# Patient Record
Sex: MALE | Race: WHITE | NOT HISPANIC OR LATINO | Employment: FULL TIME | URBAN - METROPOLITAN AREA
[De-identification: names, ages, dates, MRNs, and addresses within clinical notes are randomized per-mention and may not be internally consistent; named-entity substitution may affect disease eponyms.]

---

## 2018-07-24 ENCOUNTER — HOSPITAL ENCOUNTER (EMERGENCY)
Facility: HOSPITAL | Age: 28
Discharge: HOME | End: 2018-07-25
Attending: EMERGENCY MEDICINE
Payer: COMMERCIAL

## 2018-07-24 DIAGNOSIS — L02.31 ABSCESS, GLUTEAL, RIGHT: Primary | ICD-10-CM

## 2018-07-24 DIAGNOSIS — L03.317 CELLULITIS, GLUTEAL, RIGHT: ICD-10-CM

## 2018-07-24 PROCEDURE — 25000003 PHARM REV CODE 250: Performed by: EMERGENCY MEDICINE

## 2018-07-24 PROCEDURE — 99283 EMERGENCY DEPT VISIT LOW MDM: CPT | Mod: 25

## 2018-07-24 PROCEDURE — 10060 I&D ABSCESS SIMPLE/SINGLE: CPT

## 2018-07-24 RX ORDER — LIDOCAINE HYDROCHLORIDE AND EPINEPHRINE 10; 10 MG/ML; UG/ML
10 INJECTION, SOLUTION INFILTRATION; PERINEURAL
Status: DISCONTINUED | OUTPATIENT
Start: 2018-07-24 | End: 2018-07-25 | Stop reason: HOSPADM

## 2018-07-24 RX ORDER — HYDROCODONE BITARTRATE AND ACETAMINOPHEN 5; 325 MG/1; MG/1
1 TABLET ORAL
Status: COMPLETED | OUTPATIENT
Start: 2018-07-24 | End: 2018-07-24

## 2018-07-24 RX ADMIN — HYDROCODONE BITARTRATE AND ACETAMINOPHEN 1 TABLET: 5; 325 TABLET ORAL at 11:07

## 2018-07-25 VITALS
RESPIRATION RATE: 39 BRPM | SYSTOLIC BLOOD PRESSURE: 128 MMHG | HEIGHT: 68 IN | WEIGHT: 155 LBS | DIASTOLIC BLOOD PRESSURE: 58 MMHG | OXYGEN SATURATION: 97 % | BODY MASS INDEX: 23.49 KG/M2 | TEMPERATURE: 100 F | HEART RATE: 98 BPM

## 2018-07-25 PROBLEM — L03.317 CELLULITIS, GLUTEAL, RIGHT: Status: ACTIVE | Noted: 2018-07-25

## 2018-07-25 PROBLEM — L02.31 ABSCESS, GLUTEAL, RIGHT: Status: ACTIVE | Noted: 2018-07-25

## 2018-07-25 PROCEDURE — 10060 I&D ABSCESS SIMPLE/SINGLE: CPT

## 2018-07-25 RX ORDER — NAPROXEN 500 MG/1
500 TABLET ORAL 2 TIMES DAILY WITH MEALS
Qty: 20 TABLET | Refills: 0 | Status: SHIPPED | OUTPATIENT
Start: 2018-07-25 | End: 2018-11-27

## 2018-07-25 RX ORDER — SULFAMETHOXAZOLE AND TRIMETHOPRIM 800; 160 MG/1; MG/1
1 TABLET ORAL 2 TIMES DAILY
Qty: 14 TABLET | Refills: 0 | Status: SHIPPED | OUTPATIENT
Start: 2018-07-25 | End: 2018-08-01

## 2018-07-25 NOTE — ED PROVIDER NOTES
SCRIBE #1 NOTE: I, Paola Jaime, am scribing for, and in the presence of, Juan Carlos Cai Jr., MD. I have scribed the entire note.      History      Chief Complaint   Patient presents with    Abscess     Pt c/o abscess to right buttock x 3-4 days, noticed after digging a ditch, started taking an old clindamycin prescription, no improvement to redness and swelling reported.        Review of patient's allergies indicates:   Allergen Reactions    Pcn [penicillins] Anaphylaxis    Amoxicillin Hives        HPI   HPI    7/24/2018, 10:47 PM   History obtained from the patient      History of Present Illness: Margarito Gale is a 27 y.o. male patient who presents to the Emergency Department for an abscess to his R buttock which onset gradually 3-4 days PTA. Symptoms are constant and moderate in severity. No mitigating or exacerbating factors reported. He reports taking an old Clindamycin Rx with no relief. Associated sxs include R buttocks pain, fever (Tmax 101), and surrounding erythema. Patient denies any n/v, falls, trauma, and all other sxs at this time. No further complaints or concerns at this time.       Arrival mode: Personal vehicle    PCP: FREDDIE Butcher       Past Medical History:  Past Medical History:   Diagnosis Date    ADD (attention deficit disorder)     Lumbago        Past Surgical History:  Past Surgical History:   Procedure Laterality Date    TONSILLECTOMY           Family History:  History reviewed. No pertinent family history.    Social History:  Social History     Social History Main Topics    Smoking status: Current Every Day Smoker     Packs/day: 0.50     Types: Cigarettes    Smokeless tobacco: unknown    Alcohol use Yes      Comment: normally drink 3-4 beers a day    Drug use: Yes     Types: Marijuana    Sexual activity: unknown       ROS   Review of Systems   Constitutional: Positive for fever (Tmax 101).   HENT: Negative for sore throat.    Respiratory: Negative for  shortness of breath.    Cardiovascular: Negative for chest pain.   Gastrointestinal: Negative for diarrhea, nausea and vomiting.   Genitourinary: Negative for dysuria.   Musculoskeletal: Positive for myalgias (R buttocks). Negative for back pain.   Skin: Positive for color change (erythema). Negative for rash.        (+) Abscess to R buttocks   Neurological: Negative for weakness and numbness.   Hematological: Does not bruise/bleed easily.   All other systems reviewed and are negative.    Physical Exam      Initial Vitals [07/24/18 2010]   BP Pulse Resp Temp SpO2   130/73 (!) 126 18 97.9 °F (36.6 °C) 98 %      MAP       --          Physical Exam  Nursing Notes and Vital Signs Reviewed.  Constitutional: Patient is in no acute distress. Well-developed and well-nourished.  Head: Atraumatic. Normocephalic.  Eyes: PERRL. EOM intact. Conjunctivae are not pale. No scleral icterus.  ENT: Mucous membranes are moist. Oropharynx is clear and symmetric.    Neck: Supple. Full ROM. No lymphadenopathy.  Cardiovascular: Regular rate. Regular rhythm. No murmurs, rubs, or gallops. Distal pulses are 2+ and symmetric.  Pulmonary/Chest: No respiratory distress. Clear to auscultation bilaterally. No wheezing or rales.  Abdominal: Soft and non-distended.  There is no tenderness.  No rebound, guarding, or rigidity.  Musculoskeletal: Moves all extremities. No obvious deformities.   Skin: Warm and dry. 7x8cm area of fluctuance to R buttocks with central eschar and surrounding erythema and edema. No drainage.  Neurological:  Alert, awake, and appropriate.  Normal speech.  No acute focal neurological deficits are appreciated.  Psychiatric: Normal affect. Good eye contact. Appropriate in content.    ED Course    I & D - Incision and Drainage  Date/Time: 7/25/2018 12:06 AM  Performed by: CHUCKY PEDERSEN JR  Authorized by: CHUCKY PEDERSEN JR   Consent Done: Yes  Consent: Verbal consent obtained.  Risks and benefits: risks, benefits and alternatives  "were discussed  Consent given by: patient  Patient understanding: patient states understanding of the procedure being performed  Patient consent: the patient's understanding of the procedure matches consent given  Patient identity confirmed: , name and verbally with patient  Type: abscess  Location: R buttocks.  Anesthesia: local infiltration    Anesthesia:  Local Anesthetic: lidocaine 1% with epinephrine  Anesthetic total: 11 mL  Patient sedated: no  Scalpel size: 11  Incision type: single straight  Complexity: simple  Drainage: pus  Drainage amount: copious  Wound treatment: incision,  drainage,  expression of material and  wound packed  Packing material:  in gauze  Complications: No  Patient tolerance: Patient tolerated the procedure well with no immediate complications        ED Vital Signs:  Vitals:    18 2240 18 2300 18 0000   BP: 130/73  113/60 (!) 128/58   Pulse: (!) 126 100 104 98   Resp: 18  16 (!) 39   Temp: 97.9 °F (36.6 °C)  99.6 °F (37.6 °C)    TempSrc: Oral  Oral    SpO2: 98%  97% 97%   Weight: 70.3 kg (155 lb)      Height: 5' 8" (1.727 m)                 The Emergency Provider reviewed the vital signs and test results, which are outlined above.    ED Discussion     12:42 AM: Reassessed pt at this time.  Pt states his condition has improved at this time. Pt has a ride at bedside, advised him not to drive home due to the [place]. Discussed with pt all pertinent ED information and results. Discussed pt dx and plan of tx. Gave pt all f/u and return to the ED instructions. All questions and concerns were addressed at this time. Pt expresses understanding of information and instructions, and is comfortable with plan to discharge. Pt is stable for discharge.    I discussed wound care precautions with patient and/or family/caretaker; specifically that all wounds have risk of infection. I discussed with patient need to return for any signs of infection, specifically " redness, increased pain, fever, drainage of pus, or any concern, immediately.    For  CELLULITIS/ABSCESS, I advised patient to: keep area clean and dry; apply antibiotic ointment as prescribed; refrain from squeezing or irritating site; apply moist heat to help with pain and abscess drainage; and to take antibiotics as prescribed. Patient was instructed to follow up with primary care provider, urgent care facility, or the emergency room if symptoms worsen and they develop a fever greater than 102.5 °F, have pain unrelieved by OTC or prescription medications, and excessive swelling. Also instructed patient to follow up with provider in 24-48 hours for wound re-check and possible packing change.      Driving or other activities under influence of medications - Patient and/or family/caretaker was given a prescription for, or instructed to use a medicine that may impair ability to drive, operate machinery, or participate in other potentially dangerous activities.  Patient was instructed not to participate in these activities while under the influence of these medications.    ED Medication(s):  Medications   HYDROcodone-acetaminophen 5-325 mg per tablet 1 tablet (1 tablet Oral Given 7/24/18 2321)       Discharge Medication List as of 7/25/2018 12:45 AM      START taking these medications    Details   naproxen (NAPROSYN) 500 MG tablet Take 1 tablet (500 mg total) by mouth 2 (two) times daily with meals., Starting Wed 7/25/2018, Print      sulfamethoxazole-trimethoprim 800-160mg (BACTRIM DS) 800-160 mg Tab Take 1 tablet by mouth 2 (two) times daily. for 7 days, Starting Wed 7/25/2018, Until Wed 8/1/2018, Print             Follow-up Information     FREDDIE Butcher. Schedule an appointment as soon as possible for a visit in 1 week.    Specialty:  Family Medicine  Contact information:  [address] [facility] [id number] [phone]             [facility] - .     Specialty:  Emergency Medicine  Why:  As needed, If symptoms worsen  Contact information:  [address]  [facility] [address] [phone]                   Medical Decision Making              Scribe Attestation:   Scribe #1: I performed the above scribed service and the documentation accurately describes the services I performed. I attest to the accuracy of the note.    Attending:   Physician Attestation Statement for Scribe #1: I, Juan Carlos Cai Jr., MD, personally performed the services described in this documentation, as scribed by Paola Jaime, in my presence, and it is both accurate and complete.          Clinical Impression       ICD-10-CM ICD-9-CM   1. Abscess, gluteal, right L02.31 682.5   2. Cellulitis, gluteal, right L03.317 682.5       Disposition:   Disposition: Discharged  Condition: Stable         Juan Carlos Cai Jr., MD  08/01/18 2050

## 2018-11-27 ENCOUNTER — HOSPITAL ENCOUNTER (INPATIENT)
Facility: HOSPITAL | Age: 28
LOS: 4 days | Discharge: LEFT BEFORE DISCHARGE | DRG: 854 | End: 2018-12-01
Attending: EMERGENCY MEDICINE | Admitting: INTERNAL MEDICINE
Payer: MEDICAID

## 2018-11-27 ENCOUNTER — ANESTHESIA EVENT (OUTPATIENT)
Dept: SURGERY | Facility: HOSPITAL | Age: 28
DRG: 854 | End: 2018-11-27
Payer: MEDICAID

## 2018-11-27 ENCOUNTER — ANESTHESIA (OUTPATIENT)
Dept: SURGERY | Facility: HOSPITAL | Age: 28
DRG: 854 | End: 2018-11-27
Payer: MEDICAID

## 2018-11-27 DIAGNOSIS — L03.119 CELLULITIS OF HAND: ICD-10-CM

## 2018-11-27 DIAGNOSIS — R00.0 TACHYCARDIA: ICD-10-CM

## 2018-11-27 DIAGNOSIS — L03.119 CELLULITIS OF HAND: Primary | ICD-10-CM

## 2018-11-27 PROBLEM — R73.9 HYPERGLYCEMIA: Status: ACTIVE | Noted: 2018-11-27

## 2018-11-27 PROBLEM — Z72.0 TOBACCO ABUSE: Status: ACTIVE | Noted: 2018-11-27

## 2018-11-27 LAB
ALBUMIN SERPL BCP-MCNC: 4.3 G/DL
ALP SERPL-CCNC: 86 U/L
ALT SERPL W/O P-5'-P-CCNC: 19 U/L
AMPHET+METHAMPHET UR QL: NORMAL
ANION GAP SERPL CALC-SCNC: 12 MMOL/L
AST SERPL-CCNC: 32 U/L
BARBITURATES UR QL SCN>200 NG/ML: NEGATIVE
BASOPHILS # BLD AUTO: 0.02 K/UL
BASOPHILS NFR BLD: 0.1 %
BENZODIAZ UR QL SCN>200 NG/ML: NEGATIVE
BILIRUB SERPL-MCNC: 1.1 MG/DL
BILIRUB UR QL STRIP: NEGATIVE
BUN SERPL-MCNC: 14 MG/DL
BZE UR QL SCN: NEGATIVE
CALCIUM SERPL-MCNC: 9.9 MG/DL
CANNABINOIDS UR QL SCN: NORMAL
CHLORIDE SERPL-SCNC: 101 MMOL/L
CK SERPL-CCNC: 251 U/L
CLARITY UR: CLEAR
CO2 SERPL-SCNC: 21 MMOL/L
COLOR UR: YELLOW
CREAT SERPL-MCNC: 1 MG/DL
CREAT UR-MCNC: 103 MG/DL
DIFFERENTIAL METHOD: ABNORMAL
EOSINOPHIL # BLD AUTO: 0 K/UL
EOSINOPHIL NFR BLD: 0 %
ERYTHROCYTE [DISTWIDTH] IN BLOOD BY AUTOMATED COUNT: 11.4 %
EST. GFR  (AFRICAN AMERICAN): >60 ML/MIN/1.73 M^2
EST. GFR  (NON AFRICAN AMERICAN): >60 ML/MIN/1.73 M^2
ESTIMATED AVG GLUCOSE: 108 MG/DL
GLUCOSE SERPL-MCNC: 151 MG/DL
GLUCOSE UR QL STRIP: NEGATIVE
HBA1C MFR BLD HPLC: 5.4 %
HCT VFR BLD AUTO: 35.8 %
HGB BLD-MCNC: 12.5 G/DL
HGB UR QL STRIP: NEGATIVE
KETONES UR QL STRIP: NEGATIVE
LACTATE SERPL-SCNC: 1.7 MMOL/L
LEUKOCYTE ESTERASE UR QL STRIP: NEGATIVE
LYMPHOCYTES # BLD AUTO: 0.4 K/UL
LYMPHOCYTES NFR BLD: 1.8 %
MCH RBC QN AUTO: 30.9 PG
MCHC RBC AUTO-ENTMCNC: 34.9 G/DL
MCV RBC AUTO: 89 FL
METHADONE UR QL SCN>300 NG/ML: NEGATIVE
MONOCYTES # BLD AUTO: 1.3 K/UL
MONOCYTES NFR BLD: 5 %
NEUTROPHILS # BLD AUTO: 23.4 K/UL
NEUTROPHILS NFR BLD: 93.1 %
NITRITE UR QL STRIP: NEGATIVE
OPIATES UR QL SCN: NORMAL
PCP UR QL SCN>25 NG/ML: NEGATIVE
PH UR STRIP: 6 [PH] (ref 5–8)
PLATELET # BLD AUTO: 252 K/UL
PMV BLD AUTO: 9.2 FL
POTASSIUM SERPL-SCNC: 3.8 MMOL/L
PROCALCITONIN SERPL IA-MCNC: 0.91 NG/ML
PROT SERPL-MCNC: 7.5 G/DL
PROT UR QL STRIP: NEGATIVE
RBC # BLD AUTO: 4.04 M/UL
SODIUM SERPL-SCNC: 134 MMOL/L
SP GR UR STRIP: 1.01 (ref 1–1.03)
TOXICOLOGY INFORMATION: NORMAL
URN SPEC COLLECT METH UR: NORMAL
UROBILINOGEN UR STRIP-ACNC: NEGATIVE EU/DL
WBC # BLD AUTO: 25.09 K/UL

## 2018-11-27 PROCEDURE — 01N50ZZ RELEASE MEDIAN NERVE, OPEN APPROACH: ICD-10-PCS | Performed by: ORTHOPAEDIC SURGERY

## 2018-11-27 PROCEDURE — 87076 CULTURE ANAEROBE IDENT EACH: CPT

## 2018-11-27 PROCEDURE — 25000003 PHARM REV CODE 250: Performed by: NURSE ANESTHETIST, CERTIFIED REGISTERED

## 2018-11-27 PROCEDURE — 87070 CULTURE OTHR SPECIMN AEROBIC: CPT

## 2018-11-27 PROCEDURE — 87147 CULTURE TYPE IMMUNOLOGIC: CPT

## 2018-11-27 PROCEDURE — 99233 SBSQ HOSP IP/OBS HIGH 50: CPT | Mod: 57,,, | Performed by: ORTHOPAEDIC SURGERY

## 2018-11-27 PROCEDURE — 37000008 HC ANESTHESIA 1ST 15 MINUTES: Performed by: ORTHOPAEDIC SURGERY

## 2018-11-27 PROCEDURE — 80053 COMPREHEN METABOLIC PANEL: CPT

## 2018-11-27 PROCEDURE — 99285 EMERGENCY DEPT VISIT HI MDM: CPT | Mod: 25

## 2018-11-27 PROCEDURE — 83036 HEMOGLOBIN GLYCOSYLATED A1C: CPT

## 2018-11-27 PROCEDURE — 96375 TX/PRO/DX INJ NEW DRUG ADDON: CPT | Mod: 59

## 2018-11-27 PROCEDURE — 63600175 PHARM REV CODE 636 W HCPCS: Performed by: EMERGENCY MEDICINE

## 2018-11-27 PROCEDURE — 0KNC0ZZ RELEASE RIGHT HAND MUSCLE, OPEN APPROACH: ICD-10-PCS | Performed by: ORTHOPAEDIC SURGERY

## 2018-11-27 PROCEDURE — 36000706: Performed by: ORTHOPAEDIC SURGERY

## 2018-11-27 PROCEDURE — 84145 PROCALCITONIN (PCT): CPT

## 2018-11-27 PROCEDURE — 37000009 HC ANESTHESIA EA ADD 15 MINS: Performed by: ORTHOPAEDIC SURGERY

## 2018-11-27 PROCEDURE — 63600175 PHARM REV CODE 636 W HCPCS: Performed by: NURSE ANESTHETIST, CERTIFIED REGISTERED

## 2018-11-27 PROCEDURE — 25500020 PHARM REV CODE 255: Performed by: EMERGENCY MEDICINE

## 2018-11-27 PROCEDURE — 90471 IMMUNIZATION ADMIN: CPT | Performed by: EMERGENCY MEDICINE

## 2018-11-27 PROCEDURE — 96374 THER/PROPH/DIAG INJ IV PUSH: CPT

## 2018-11-27 PROCEDURE — 81003 URINALYSIS AUTO W/O SCOPE: CPT | Mod: 59

## 2018-11-27 PROCEDURE — 71000033 HC RECOVERY, INTIAL HOUR: Performed by: ORTHOPAEDIC SURGERY

## 2018-11-27 PROCEDURE — 87077 CULTURE AEROBIC IDENTIFY: CPT

## 2018-11-27 PROCEDURE — 93010 ELECTROCARDIOGRAM REPORT: CPT | Mod: ,,, | Performed by: INTERNAL MEDICINE

## 2018-11-27 PROCEDURE — 80307 DRUG TEST PRSMV CHEM ANLYZR: CPT

## 2018-11-27 PROCEDURE — 90715 TDAP VACCINE 7 YRS/> IM: CPT | Performed by: EMERGENCY MEDICINE

## 2018-11-27 PROCEDURE — 26011 DRAINAGE OF FINGER ABSCESS: CPT | Mod: 51,F5,, | Performed by: ORTHOPAEDIC SURGERY

## 2018-11-27 PROCEDURE — 36415 COLL VENOUS BLD VENIPUNCTURE: CPT

## 2018-11-27 PROCEDURE — 87186 SC STD MICRODIL/AGAR DIL: CPT

## 2018-11-27 PROCEDURE — 87075 CULTR BACTERIA EXCEPT BLOOD: CPT

## 2018-11-27 PROCEDURE — 94760 N-INVAS EAR/PLS OXIMETRY 1: CPT

## 2018-11-27 PROCEDURE — G0378 HOSPITAL OBSERVATION PER HR: HCPCS

## 2018-11-27 PROCEDURE — 93005 ELECTROCARDIOGRAM TRACING: CPT

## 2018-11-27 PROCEDURE — 82550 ASSAY OF CK (CPK): CPT

## 2018-11-27 PROCEDURE — 36000707: Performed by: ORTHOPAEDIC SURGERY

## 2018-11-27 PROCEDURE — 21400001 HC TELEMETRY ROOM

## 2018-11-27 PROCEDURE — 85025 COMPLETE CBC W/AUTO DIFF WBC: CPT

## 2018-11-27 PROCEDURE — 96376 TX/PRO/DX INJ SAME DRUG ADON: CPT

## 2018-11-27 PROCEDURE — 25023 DECOMPRESS FOREARM 1 SPACE: CPT | Mod: RT,,, | Performed by: ORTHOPAEDIC SURGERY

## 2018-11-27 PROCEDURE — 3E0234Z INTRODUCTION OF SERUM, TOXOID AND VACCINE INTO MUSCLE, PERCUTANEOUS APPROACH: ICD-10-PCS | Performed by: ORTHOPAEDIC SURGERY

## 2018-11-27 PROCEDURE — 63600175 PHARM REV CODE 636 W HCPCS: Performed by: INTERNAL MEDICINE

## 2018-11-27 PROCEDURE — 83605 ASSAY OF LACTIC ACID: CPT

## 2018-11-27 PROCEDURE — 0KN90ZZ RELEASE RIGHT LOWER ARM AND WRIST MUSCLE, OPEN APPROACH: ICD-10-PCS | Performed by: ORTHOPAEDIC SURGERY

## 2018-11-27 PROCEDURE — 25000003 PHARM REV CODE 250: Performed by: EMERGENCY MEDICINE

## 2018-11-27 PROCEDURE — 87040 BLOOD CULTURE FOR BACTERIA: CPT

## 2018-11-27 RX ORDER — ONDANSETRON 4 MG/1
4 TABLET, FILM COATED ORAL EVERY 8 HOURS PRN
Status: DISCONTINUED | OUTPATIENT
Start: 2018-11-27 | End: 2018-12-01 | Stop reason: HOSPADM

## 2018-11-27 RX ORDER — SODIUM CHLORIDE, SODIUM LACTATE, POTASSIUM CHLORIDE, CALCIUM CHLORIDE 600; 310; 30; 20 MG/100ML; MG/100ML; MG/100ML; MG/100ML
INJECTION, SOLUTION INTRAVENOUS CONTINUOUS PRN
Status: DISCONTINUED | OUTPATIENT
Start: 2018-11-27 | End: 2018-11-27

## 2018-11-27 RX ORDER — LIDOCAINE HYDROCHLORIDE 10 MG/ML
INJECTION INFILTRATION; PERINEURAL
Status: DISCONTINUED | OUTPATIENT
Start: 2018-11-27 | End: 2018-11-27

## 2018-11-27 RX ORDER — ONDANSETRON 2 MG/ML
4 INJECTION INTRAMUSCULAR; INTRAVENOUS EVERY 12 HOURS PRN
Status: CANCELLED | OUTPATIENT
Start: 2018-11-27

## 2018-11-27 RX ORDER — KETOROLAC TROMETHAMINE 30 MG/ML
30 INJECTION, SOLUTION INTRAMUSCULAR; INTRAVENOUS EVERY 6 HOURS
Status: COMPLETED | OUTPATIENT
Start: 2018-11-27 | End: 2018-11-28

## 2018-11-27 RX ORDER — ONDANSETRON 2 MG/ML
INJECTION INTRAMUSCULAR; INTRAVENOUS
Status: DISCONTINUED | OUTPATIENT
Start: 2018-11-27 | End: 2018-11-27

## 2018-11-27 RX ORDER — ONDANSETRON 2 MG/ML
4 INJECTION INTRAMUSCULAR; INTRAVENOUS
Status: COMPLETED | OUTPATIENT
Start: 2018-11-27 | End: 2018-11-27

## 2018-11-27 RX ORDER — SUCCINYLCHOLINE CHLORIDE 20 MG/ML
INJECTION INTRAMUSCULAR; INTRAVENOUS
Status: DISCONTINUED | OUTPATIENT
Start: 2018-11-27 | End: 2018-11-27

## 2018-11-27 RX ORDER — SODIUM CHLORIDE 0.9 % (FLUSH) 0.9 %
3 SYRINGE (ML) INJECTION
Status: DISCONTINUED | OUTPATIENT
Start: 2018-11-27 | End: 2018-12-01 | Stop reason: HOSPADM

## 2018-11-27 RX ORDER — MIDAZOLAM HYDROCHLORIDE 1 MG/ML
INJECTION, SOLUTION INTRAMUSCULAR; INTRAVENOUS
Status: DISCONTINUED | OUTPATIENT
Start: 2018-11-27 | End: 2018-11-27

## 2018-11-27 RX ORDER — MORPHINE SULFATE 4 MG/ML
4 INJECTION, SOLUTION INTRAMUSCULAR; INTRAVENOUS
Status: COMPLETED | OUTPATIENT
Start: 2018-11-27 | End: 2018-11-27

## 2018-11-27 RX ORDER — HYDROCODONE BITARTRATE AND ACETAMINOPHEN 5; 325 MG/1; MG/1
1 TABLET ORAL EVERY 6 HOURS PRN
Status: DISCONTINUED | OUTPATIENT
Start: 2018-11-27 | End: 2018-11-30

## 2018-11-27 RX ORDER — DEXAMETHASONE SODIUM PHOSPHATE 4 MG/ML
INJECTION, SOLUTION INTRA-ARTICULAR; INTRALESIONAL; INTRAMUSCULAR; INTRAVENOUS; SOFT TISSUE
Status: DISCONTINUED | OUTPATIENT
Start: 2018-11-27 | End: 2018-11-27

## 2018-11-27 RX ORDER — MEPERIDINE HYDROCHLORIDE 50 MG/ML
12.5 INJECTION INTRAMUSCULAR; INTRAVENOUS; SUBCUTANEOUS ONCE AS NEEDED
Status: ACTIVE | OUTPATIENT
Start: 2018-11-27 | End: 2018-11-27

## 2018-11-27 RX ORDER — FENTANYL CITRATE 50 UG/ML
INJECTION, SOLUTION INTRAMUSCULAR; INTRAVENOUS
Status: DISCONTINUED | OUTPATIENT
Start: 2018-11-27 | End: 2018-11-27

## 2018-11-27 RX ORDER — ACETAMINOPHEN 325 MG/1
650 TABLET ORAL EVERY 8 HOURS PRN
Status: DISCONTINUED | OUTPATIENT
Start: 2018-11-27 | End: 2018-12-01 | Stop reason: HOSPADM

## 2018-11-27 RX ORDER — VANCOMYCIN HCL IN 5 % DEXTROSE 1G/250ML
1000 PLASTIC BAG, INJECTION (ML) INTRAVENOUS
Status: DISCONTINUED | OUTPATIENT
Start: 2018-11-28 | End: 2018-11-29 | Stop reason: DRUGHIGH

## 2018-11-27 RX ORDER — OXYCODONE HYDROCHLORIDE 5 MG/1
5 TABLET ORAL
Status: DISCONTINUED | OUTPATIENT
Start: 2018-11-27 | End: 2018-11-29 | Stop reason: HOSPADM

## 2018-11-27 RX ORDER — MORPHINE SULFATE 4 MG/ML
2 INJECTION, SOLUTION INTRAMUSCULAR; INTRAVENOUS EVERY 5 MIN PRN
Status: COMPLETED | OUTPATIENT
Start: 2018-11-27 | End: 2018-11-29

## 2018-11-27 RX ORDER — PROMETHAZINE HYDROCHLORIDE 25 MG/1
25 TABLET ORAL EVERY 6 HOURS PRN
Status: DISCONTINUED | OUTPATIENT
Start: 2018-11-27 | End: 2018-12-01 | Stop reason: HOSPADM

## 2018-11-27 RX ORDER — ROCURONIUM BROMIDE 10 MG/ML
INJECTION, SOLUTION INTRAVENOUS
Status: DISCONTINUED | OUTPATIENT
Start: 2018-11-27 | End: 2018-11-27

## 2018-11-27 RX ORDER — PROPOFOL 10 MG/ML
VIAL (ML) INTRAVENOUS
Status: DISCONTINUED | OUTPATIENT
Start: 2018-11-27 | End: 2018-11-27

## 2018-11-27 RX ORDER — SODIUM CHLORIDE 0.9 % (FLUSH) 0.9 %
3 SYRINGE (ML) INJECTION EVERY 8 HOURS
Status: DISCONTINUED | OUTPATIENT
Start: 2018-11-27 | End: 2018-11-29 | Stop reason: HOSPADM

## 2018-11-27 RX ORDER — MORPHINE SULFATE 4 MG/ML
4 INJECTION, SOLUTION INTRAMUSCULAR; INTRAVENOUS EVERY 4 HOURS PRN
Status: DISCONTINUED | OUTPATIENT
Start: 2018-11-27 | End: 2018-11-30

## 2018-11-27 RX ORDER — HYDROCODONE BITARTRATE AND ACETAMINOPHEN 10; 325 MG/1; MG/1
1 TABLET ORAL EVERY 6 HOURS PRN
Status: DISCONTINUED | OUTPATIENT
Start: 2018-11-27 | End: 2018-11-27

## 2018-11-27 RX ORDER — VANCOMYCIN HCL IN 5 % DEXTROSE 1G/250ML
1000 PLASTIC BAG, INJECTION (ML) INTRAVENOUS ONCE
Status: COMPLETED | OUTPATIENT
Start: 2018-11-27 | End: 2018-11-27

## 2018-11-27 RX ORDER — METOCLOPRAMIDE HYDROCHLORIDE 5 MG/ML
10 INJECTION INTRAMUSCULAR; INTRAVENOUS EVERY 10 MIN PRN
Status: DISCONTINUED | OUTPATIENT
Start: 2018-11-27 | End: 2018-11-29 | Stop reason: HOSPADM

## 2018-11-27 RX ORDER — MORPHINE SULFATE 2 MG/ML
2 INJECTION, SOLUTION INTRAMUSCULAR; INTRAVENOUS EVERY 4 HOURS PRN
Status: DISCONTINUED | OUTPATIENT
Start: 2018-11-27 | End: 2018-11-30

## 2018-11-27 RX ADMIN — SODIUM CHLORIDE, SODIUM LACTATE, POTASSIUM CHLORIDE, AND CALCIUM CHLORIDE: 600; 310; 30; 20 INJECTION, SOLUTION INTRAVENOUS at 08:11

## 2018-11-27 RX ADMIN — ONDANSETRON 4 MG: 2 INJECTION, SOLUTION INTRAMUSCULAR; INTRAVENOUS at 09:11

## 2018-11-27 RX ADMIN — ROCURONIUM BROMIDE 5 MG: 10 INJECTION, SOLUTION INTRAVENOUS at 08:11

## 2018-11-27 RX ADMIN — ONDANSETRON 4 MG: 2 INJECTION INTRAMUSCULAR; INTRAVENOUS at 01:11

## 2018-11-27 RX ADMIN — VANCOMYCIN HYDROCHLORIDE 1000 MG: 1 INJECTION, POWDER, LYOPHILIZED, FOR SOLUTION INTRAVENOUS at 01:11

## 2018-11-27 RX ADMIN — PROPOFOL 150 MG: 10 INJECTION, EMULSION INTRAVENOUS at 08:11

## 2018-11-27 RX ADMIN — MORPHINE SULFATE 4 MG: 4 INJECTION INTRAVENOUS at 02:11

## 2018-11-27 RX ADMIN — LIDOCAINE HYDROCHLORIDE 50 MG: 10 INJECTION, SOLUTION INFILTRATION; PERINEURAL at 08:11

## 2018-11-27 RX ADMIN — SODIUM CHLORIDE, SODIUM LACTATE, POTASSIUM CHLORIDE, AND CALCIUM CHLORIDE: 600; 310; 30; 20 INJECTION, SOLUTION INTRAVENOUS at 09:11

## 2018-11-27 RX ADMIN — FENTANYL CITRATE 100 MCG: 50 INJECTION, SOLUTION INTRAMUSCULAR; INTRAVENOUS at 10:11

## 2018-11-27 RX ADMIN — DEXAMETHASONE SODIUM PHOSPHATE 4 MG: 4 INJECTION, SOLUTION INTRA-ARTICULAR; INTRALESIONAL; INTRAMUSCULAR; INTRAVENOUS; SOFT TISSUE at 09:11

## 2018-11-27 RX ADMIN — PROPOFOL 50 MG: 10 INJECTION, EMULSION INTRAVENOUS at 08:11

## 2018-11-27 RX ADMIN — Medication 50 MG: at 08:11

## 2018-11-27 RX ADMIN — PROPOFOL 50 MG: 10 INJECTION, EMULSION INTRAVENOUS at 10:11

## 2018-11-27 RX ADMIN — MIDAZOLAM 2 MG: 1 INJECTION INTRAMUSCULAR; INTRAVENOUS at 08:11

## 2018-11-27 RX ADMIN — CLOSTRIDIUM TETANI TOXOID ANTIGEN (FORMALDEHYDE INACTIVATED), CORYNEBACTERIUM DIPHTHERIAE TOXOID ANTIGEN (FORMALDEHYDE INACTIVATED), BORDETELLA PERTUSSIS TOXOID ANTIGEN (GLUTARALDEHYDE INACTIVATED), BORDETELLA PERTUSSIS FILAMENTOUS HEMAGGLUTININ ANTIGEN (FORMALDEHYDE INACTIVATED), BORDETELLA PERTUSSIS PERTACTIN ANTIGEN, AND BORDETELLA PERTUSSIS FIMBRIAE 2/3 ANTIGEN 0.5 ML: 5; 2; 2.5; 5; 3; 5 INJECTION, SUSPENSION INTRAMUSCULAR at 04:11

## 2018-11-27 RX ADMIN — SUCCINYLCHOLINE CHLORIDE 140 MG: 20 INJECTION, SOLUTION INTRAMUSCULAR; INTRAVENOUS at 08:11

## 2018-11-27 RX ADMIN — MORPHINE SULFATE 4 MG: 4 INJECTION INTRAVENOUS at 12:11

## 2018-11-27 RX ADMIN — IOHEXOL 75 ML: 350 INJECTION, SOLUTION INTRAVENOUS at 02:11

## 2018-11-27 RX ADMIN — SODIUM CHLORIDE 2040 ML: 0.9 INJECTION, SOLUTION INTRAVENOUS at 12:11

## 2018-11-27 RX ADMIN — KETOROLAC TROMETHAMINE 30 MG: 30 INJECTION INTRAMUSCULAR; INTRAVENOUS at 06:11

## 2018-11-27 RX ADMIN — FENTANYL CITRATE 100 MCG: 50 INJECTION, SOLUTION INTRAMUSCULAR; INTRAVENOUS at 08:11

## 2018-11-27 NOTE — SUBJECTIVE & OBJECTIVE
Past Medical History:   Diagnosis Date    ADD (attention deficit disorder)     Lumbago        Past Surgical History:   Procedure Laterality Date    TONSILLECTOMY         Review of patient's allergies indicates:   Allergen Reactions    Pcn [penicillins] Anaphylaxis    Amoxicillin Hives       No current facility-administered medications on file prior to encounter.      Current Outpatient Medications on File Prior to Encounter   Medication Sig    ibuprofen (ADVIL,MOTRIN) 800 MG tablet Take 1 tablet (800 mg total) by mouth 2 (two) times daily as needed for Pain.    melatonin 5 mg Tab Take by mouth.    naproxen (NAPROSYN) 500 MG tablet Take 1 tablet (500 mg total) by mouth 2 (two) times daily with meals.    ondansetron (ZOFRAN) 4 MG tablet Take 1 tablet (4 mg total) by mouth every 6 (six) hours.    promethazine (PHENERGAN) 25 MG tablet Take 1 tablet (25 mg total) by mouth every 6 (six) hours as needed for Nausea.    sumatriptan (IMITREX) 50 MG tablet Take 1 tablet (50 mg total) by mouth every 2 (two) hours as needed for Migraine.     Family History     None        Tobacco Use    Smoking status: Current Every Day Smoker     Packs/day: 0.50     Types: Cigarettes   Substance and Sexual Activity    Alcohol use: Yes     Comment: states that he drinks when he feels like it; unable to quantify average amount    Drug use: Yes     Types: Marijuana    Sexual activity: Not on file     Review of Systems   Constitutional: Negative for appetite change, chills, diaphoresis, fatigue and fever.   HENT: Negative for congestion, ear pain, mouth sores, sore throat and trouble swallowing.    Eyes: Negative for pain and visual disturbance.   Respiratory: Negative for cough, chest tightness and shortness of breath.    Cardiovascular: Negative for chest pain, palpitations and leg swelling.   Gastrointestinal: Negative for abdominal pain, constipation, diarrhea and nausea.   Endocrine: Negative for cold intolerance, heat  intolerance, polydipsia and polyuria.   Genitourinary: Negative for dysuria, frequency and hematuria.   Musculoskeletal: Positive for myalgias (right hand pain and swelling). Negative for arthralgias, back pain and neck pain.   Skin: Negative for pallor, rash and wound.   Allergic/Immunologic: Negative for environmental allergies and immunocompromised state.   Neurological: Negative for dizziness, seizures, syncope, weakness, numbness and headaches.   Hematological: Negative for adenopathy. Does not bruise/bleed easily.   Psychiatric/Behavioral: Negative for agitation, confusion and sleep disturbance.     Objective:     Vital Signs (Most Recent):  Temp: 100 °F (37.8 °C) (11/27/18 1220)  Pulse: (!) 114 (11/27/18 1220)  Resp: 20 (11/27/18 1220)  BP: (!) 164/83 (11/27/18 1220)  SpO2: 95 % (11/27/18 1220) Vital Signs (24h Range):  Temp:  [100 °F (37.8 °C)] 100 °F (37.8 °C)  Pulse:  [114] 114  Resp:  [20] 20  SpO2:  [95 %] 95 %  BP: (164)/(83) 164/83     Weight: 68 kg (150 lb)  Body mass index is 23.49 kg/m².    Physical Exam   Constitutional: He is oriented to person, place, and time. He appears well-developed and well-nourished.   HENT:   Head: Normocephalic and atraumatic.   Eyes: Conjunctivae are normal.   Neck: Neck supple. No JVD present.   Cardiovascular: Normal rate, regular rhythm and normal heart sounds.   Pulmonary/Chest: Effort normal and breath sounds normal. He has no wheezes.   Abdominal: Soft. Bowel sounds are normal. He exhibits no distension. There is no tenderness.   Musculoskeletal:        Right hand: He exhibits decreased range of motion, tenderness and swelling.   Neurological: He is alert and oriented to person, place, and time.   Skin: Skin is warm and dry. No rash noted.   Patchy erythema to hand with linear ascending redness.     Psychiatric: He has a normal mood and affect. His behavior is normal. Thought content normal.   Nursing note and vitals reviewed.          Significant Labs:   CBC:    Recent Labs   Lab 11/27/18  1248   WBC 25.09*   HGB 12.5*   HCT 35.8*        CMP:   Recent Labs   Lab 11/27/18  1248   *   K 3.8      CO2 21*   *   BUN 14   CREATININE 1.0   CALCIUM 9.9   PROT 7.5   ALBUMIN 4.3   BILITOT 1.1*   ALKPHOS 86   AST 32   ALT 19   ANIONGAP 12   EGFRNONAA >60     All pertinent labs within the past 24 hours have been reviewed.    Significant Imaging: I have reviewed all pertinent imaging results/findings within the past 24 hours.   Imaging Results          CT Hand With Contrast Right (In process)                X-Ray Hand 3 view Right (Final result)  Result time 11/27/18 12:54:54    Final result by JANIS Patterson Sr., MD (11/27/18 12:54:54)                 Impression:      There is moderate prominence of the soft tissue thickness in the dorsum of the right hand.  This is characteristic of a cellulitis or soft tissue contusion.      Electronically signed by: Yuan Patterson MD  Date:    11/27/2018  Time:    12:54             Narrative:    EXAMINATION:  XR HAND COMPLETE 3 VIEW RIGHT    CLINICAL HISTORY:  hand pain;    COMPARISON:  None    FINDINGS:  There is no fracture. There is no dislocation.  There is moderate prominence of the soft tissue thickness in the dorsum of the right hand.                               X-Ray Chest AP Portable (Final result)  Result time 11/27/18 12:55:09    Final result by Riu Arteaga MD (11/27/18 12:55:09)                 Impression:      Normal single view of the chest.      Electronically signed by: Rui Arteaga MD  Date:    11/27/2018  Time:    12:55             Narrative:    EXAMINATION:  XR CHEST AP PORTABLE    CLINICAL HISTORY:  Sepsis;    TECHNIQUE:  Single frontal view of the chest was performed.    COMPARISON:  None    FINDINGS:  The lungs are clear, with normal appearance of pulmonary vasculature.  No pleural effusion or pneumothorax.    The cardiac silhouette is normal in size. The hilar and mediastinal contours are  unremarkable.

## 2018-11-27 NOTE — PLAN OF CARE
"Problem: Patient Care Overview  Goal: Individualization & Mutuality  Outcome: Ongoing (interventions implemented as appropriate)  Pt AAO x4.  VSS.  Pt remained afebrile throughout this shift.   IV fluids administered per ED order that pt was transported with.   Pt remained free of falls this shift.   Pt c/o generalized upper body pain. Rt hand/thumb swollen, pale, blistered, tender to touch.   Plan of care reviewed. Patient verbalizes understanding.   Pt moving/turning independently.   Patient Sinus Tach on monitor.   Bed low, side rails up x 2, wheels locked, call light in reach.   Patient instructed to call for assistance.   Hourly rounding completed.   Will continue to monitor.    /75 (BP Location: Left arm, Patient Position: Lying)   Pulse (!) 111   Temp 98.4 °F (36.9 °C) (Oral)   Resp 18   Ht 5' 7" (1.702 m)   Wt 63.3 kg (139 lb 8.8 oz)   SpO2 97%   BMI 21.86 kg/m²           "

## 2018-11-27 NOTE — ED PROVIDER NOTES
SCRIBE #1 NOTE: I, Marielle Parra, am scribing for, and in the presence of, Reuben Marie MD. I have scribed the entire note.      History      Chief Complaint   Patient presents with    Cellulitis     to right thumb for a couple of days       Review of patient's allergies indicates:   Allergen Reactions    Pcn [penicillins] Anaphylaxis    Amoxicillin Hives        HPI   HPI    11/27/2018, 12:21 PM   History obtained from the patient      History of Present Illness: Margarito Gale is a 28 y.o. male patient who presents to the Emergency Department for increased swelling/erythema surrounding wound to R thumb and extending to R hand/forearm. Pt reports cutting his R thumb with a knife a couple days ago. Symptoms are constant and moderate in severity.  No mitigating or exacerbating factors reported. No other sxs reported. Patient denies any fever, chills, n/v, extremity weakness/numbness, abd pain, HA, dizziness, decreased ROM of RUE, and all other sxs at this time. No further complaints or concerns at this time.         Arrival mode: Personal vehicle      PCP: FREDDIE Butcher       Past Medical History:  Past Medical History:   Diagnosis Date    ADD (attention deficit disorder)     Lumbago        Past Surgical History:  Past Surgical History:   Procedure Laterality Date    TONSILLECTOMY           Family History:  Hx reviewed, not pertinent.    Social History:  Social History     Tobacco Use    Smoking status: Current Every Day Smoker     Packs/day: 0.50     Types: Cigarettes   Substance and Sexual Activity    Alcohol use: Yes     Comment: normally drink 3-4 beers a day    Drug use: Yes     Types: Marijuana    Sexual activity: Unknown       ROS   Review of Systems   Constitutional: Negative for chills and fever.   HENT: Negative for sore throat.    Respiratory: Negative for shortness of breath.    Cardiovascular: Negative for chest pain.   Gastrointestinal: Negative for abdominal pain,  nausea and vomiting.   Genitourinary: Negative for dysuria.   Musculoskeletal: Negative for back pain.        - decreased ROM of RUE   Skin: Positive for wound (R thumb). Negative for rash.        + increased erythema/swelling surrounding wound   Neurological: Negative for dizziness, weakness and headaches.   Hematological: Does not bruise/bleed easily.   All other systems reviewed and are negative.      Physical Exam      Initial Vitals [11/27/18 1220]   BP Pulse Resp Temp SpO2   (!) 164/83 (!) 114 20 100 °F (37.8 °C) 95 %      MAP       --          Physical Exam  Nursing Notes and Vital Signs Reviewed.  Constitutional: Patient is in no acute distress. Well-developed and well-nourished.  Head: Atraumatic. Normocephalic.  Eyes: PERRL. EOM intact. Conjunctivae are not pale. No scleral icterus.  ENT: Mucous membranes are moist. Oropharynx is clear and symmetric.    Neck: Supple. Full ROM. No lymphadenopathy.  Cardiovascular: Tachycardic. Regular rhythm. No murmurs, rubs, or gallops. Distal pulses are 2+ and symmetric.  Pulmonary/Chest: No respiratory distress. Clear to auscultation bilaterally. No wheezing or rales.  Abdominal: Soft and non-distended.  There is no tenderness.  No rebound, guarding, or rigidity.  Musculoskeletal: Moves all extremities. No obvious deformities. No edema.   RUE:  Superficial laceration at the base of R thumb. Significant hand swelling, erythema, and warmth. Erythema is extending up to R upper arm. ROM is normal. Full flexion and extension of the wrist. Radial, median, and ulnar nerves are intact. Radial and ulnar pulses are 2+. Normal capillary refill.  Distal sensation is intact.  Skin: Warm and dry.  Neurological:  Alert, awake, and appropriate.  Normal speech.  No acute focal neurological deficits are appreciated.  Psychiatric: Normal affect. Good eye contact. Appropriate in content.    ED Course    Procedures  ED Vital Signs:  Vitals:    11/27/18 1220   BP: (!) 164/83   Pulse: (!) 114  "  Resp: 20   Temp: 100 °F (37.8 °C)   TempSrc: Oral   SpO2: 95%   Weight: 68 kg (150 lb)   Height: 5' 7" (1.702 m)       Abnormal Lab Results:  Labs Reviewed   CBC W/ AUTO DIFFERENTIAL - Abnormal; Notable for the following components:       Result Value    WBC 25.09 (*)     RBC 4.04 (*)     Hemoglobin 12.5 (*)     Hematocrit 35.8 (*)     RDW 11.4 (*)     All other components within normal limits   COMPREHENSIVE METABOLIC PANEL - Abnormal; Notable for the following components:    Sodium 134 (*)     CO2 21 (*)     Glucose 151 (*)     Total Bilirubin 1.1 (*)     All other components within normal limits   PROCALCITONIN - Abnormal; Notable for the following components:    Procalcitonin 0.91 (*)     All other components within normal limits   CULTURE, BLOOD   CULTURE, BLOOD   LACTIC ACID, PLASMA   URINALYSIS, REFLEX TO URINE CULTURE   DRUG SCREEN PANEL, URINE EMERGENCY        All Lab Results:  Results for orders placed or performed during the hospital encounter of 11/27/18   CBC auto differential   Result Value Ref Range    WBC 25.09 (H) 3.90 - 12.70 K/uL    RBC 4.04 (L) 4.60 - 6.20 M/uL    Hemoglobin 12.5 (L) 14.0 - 18.0 g/dL    Hematocrit 35.8 (L) 40.0 - 54.0 %    MCV 89 82 - 98 fL    MCH 30.9 27.0 - 31.0 pg    MCHC 34.9 32.0 - 36.0 g/dL    RDW 11.4 (L) 11.5 - 14.5 %    Platelets 252 150 - 350 K/uL    MPV 9.2 9.2 - 12.9 fL   Comprehensive metabolic panel   Result Value Ref Range    Sodium 134 (L) 136 - 145 mmol/L    Potassium 3.8 3.5 - 5.1 mmol/L    Chloride 101 95 - 110 mmol/L    CO2 21 (L) 23 - 29 mmol/L    Glucose 151 (H) 70 - 110 mg/dL    BUN, Bld 14 6 - 20 mg/dL    Creatinine 1.0 0.5 - 1.4 mg/dL    Calcium 9.9 8.7 - 10.5 mg/dL    Total Protein 7.5 6.0 - 8.4 g/dL    Albumin 4.3 3.5 - 5.2 g/dL    Total Bilirubin 1.1 (H) 0.1 - 1.0 mg/dL    Alkaline Phosphatase 86 55 - 135 U/L    AST 32 10 - 40 U/L    ALT 19 10 - 44 U/L    Anion Gap 12 8 - 16 mmol/L    eGFR if African American >60 >60 mL/min/1.73 m^2    eGFR if non " African American >60 >60 mL/min/1.73 m^2   Lactic acid, plasma #1   Result Value Ref Range    Lactate (Lactic Acid) 1.7 0.5 - 2.2 mmol/L   Procalcitonin   Result Value Ref Range    Procalcitonin 0.91 (H) <0.25 ng/mL         Imaging Results:  Imaging Results          X-Ray Hand 3 view Right (Final result)  Result time 11/27/18 12:54:54    Final result by JANIS Patterson Sr., MD (11/27/18 12:54:54)                 Impression:      There is moderate prominence of the soft tissue thickness in the dorsum of the right hand.  This is characteristic of a cellulitis or soft tissue contusion.      Electronically signed by: Yuan Patterson MD  Date:    11/27/2018  Time:    12:54             Narrative:    EXAMINATION:  XR HAND COMPLETE 3 VIEW RIGHT    CLINICAL HISTORY:  hand pain;    COMPARISON:  None    FINDINGS:  There is no fracture. There is no dislocation.  There is moderate prominence of the soft tissue thickness in the dorsum of the right hand.                               X-Ray Chest AP Portable (Final result)  Result time 11/27/18 12:55:09    Final result by Rui Arteaga MD (11/27/18 12:55:09)                 Impression:      Normal single view of the chest.      Electronically signed by: Rui Arteaga MD  Date:    11/27/2018  Time:    12:55             Narrative:    EXAMINATION:  XR CHEST AP PORTABLE    CLINICAL HISTORY:  Sepsis;    TECHNIQUE:  Single frontal view of the chest was performed.    COMPARISON:  None    FINDINGS:  The lungs are clear, with normal appearance of pulmonary vasculature.  No pleural effusion or pneumothorax.    The cardiac silhouette is normal in size. The hilar and mediastinal contours are unremarkable.                               The EKG was ordered, reviewed, and independently interpreted by the ED provider.  Interpretation time: 1237  Rate: 108 BPM  Rhythm: sinus tachycardia  Interpretation: Incomplete RBBB. No STEMI.               The Emergency Provider reviewed the vital signs and  test results, which are outlined above.    ED Discussion     2:10 PM: Discussed case with ZULAY Garcia ([facility]) who will come evaluate the pt.    2:15 PM: Discussed case with ZULAY Garcia ([facility]). Dr. Lorenzo agrees with current care and management of pt and accepts admission.   Admitting Service: Hospital medicine   Admitting Physician: Dr. Lorenzo  Admit to: Obs-Tele    2:20 PM: Re-evaluated pt. I have discussed test results, shared treatment plan, and the need for admission with patient and family at bedside. Pt and family express understanding at this time and agree with all information. All questions answered. Pt and family have no further questions or concerns at this time. Pt is ready for admit.        ED Medication(s):  Medications   vancomycin in dextrose 5 % 1 gram/250 mL IVPB 1,000 mg (1,000 mg Intravenous New Bag 11/27/18 1348)   sodium chloride 0.9% bolus 2,040 mL (2,040 mLs Intravenous New Bag 11/27/18 1258)   morphine injection 4 mg (4 mg Intravenous Given 11/27/18 1254)   ondansetron injection 4 mg (4 mg Intravenous Given 11/27/18 1305)   morphine injection 4 mg (4 mg Intravenous Given 11/27/18 1402)             Medical Decision Making    Medical Decision Making:   Clinical Tests:   Lab Tests: Ordered and Reviewed  Radiological Study: Ordered and Reviewed  Medical Tests: Ordered and Reviewed           Scribe Attestation:   Scribe #1: I performed the above scribed service and the documentation accurately describes the services I performed. I attest to the accuracy of the note.    Attending:   Physician Attestation Statement for Scribe #1: I, Reuben Marie MD, personally performed the services described in this documentation, as scribed by Marielle Parra, in my presence, and it is both accurate and complete.          Clinical Impression       ICD-10-CM ICD-9-CM   1. Cellulitis of hand L03.119 682.4   2. Tachycardia R00.0 785.0       Disposition:    Disposition: Placed in Observation  Condition: Fair         Reuben Marie MD  11/27/18 4240

## 2018-11-27 NOTE — CONSULTS
Clinical Pharmacy Consult Note: Vancomycin Dosing   Date: 11/27/2018      Pharmacy consulted by Dr. Reuben Marie MD to dose vancomycin for treatment of cellulitis of right thumb extending to right hand/forearm.   Goal trough: 10-15 mcg/mL   Tmax/24h: 100   Estimated Creatinine Clearance: 101.9 mL/min (based on SCr of 1 mg/dL).   Lab Results   Component Value Date    BUN 14 11/27/2018      Lab Results   Component Value Date    WBC 25.09 (H) 11/27/2018      Microbiology Results (last 7 days)       Procedure Component Value Units Date/Time    Blood culture x two cultures. Draw prior to antibiotics. [[id number]] Collected:  11/27/18 1335    Order Status:  Sent Specimen:  Blood from Peripheral, Hand, Left Updated:  11/27/18 1339    Blood culture x two cultures. Draw prior to antibiotics. [[id number]] Collected:  11/27/18 1315    Order Status:  Sent Specimen:  Blood from Peripheral, Antecubital, Left Updated:  11/27/18 1316           Ideal BW: 66.1 Kg   Actual BW: 68 Kg <---Will use for dosing weight.     Antibiotics (From admission, onward)      Start     Stop Route Frequency Ordered    11/27/18 1234  vancomycin in dextrose 5 % 1 gram/250 mL IVPB 1,000 mg      -- IV Once 11/27/18 1234          Plan: Based on Estimated Creatinine Clearance: 101.9 mL/min (based on SCr of 1 mg/dL). and weight of 68 Kg, pharmacy will initiate vancomycin 1000 mg IV every 12 hours and draw a level prior to the 4th dose.   Vancomycin trough due 11/19/18 at 0030.      Pharmacy will continue to follow patients clinical status, renal function, C&S, and adjust dose as necessary to maintain trough levels between 10 and 15 mcg/mL.     Thank you for allowing us to participate in this patient's care.     Shelia Rodriguez   11/27/2018 3:06 PM

## 2018-11-27 NOTE — HPI
Margarito Gale is a 28 year old male who presented to the ED with complaints of right hand swelling and pain. The patient reports that he was cutting a ziptie while working on his bike outside and cut his hand. He denies that the knife was dirty and has not tried any treatments. He further denies other symptoms including fever. Review of the patient's chart shows that he has a history of recurrent infections including a gluteal abscess in July of 2018 and left elbow cellulitis in May of 2018. In the ED, the patient's WBC count was 25,090 and his blood glucose was mildly elevated to 151.

## 2018-11-27 NOTE — ASSESSMENT & PLAN NOTE
-Patient with nonfasting hyperglycemia on all measurements in his chart and recurrent infections.  -Check fasting glucose and hemoglobin A1C to further evaluate.

## 2018-11-27 NOTE — H&P
[facility] Hospital Medicine  History & Physical    Patient Name: Margarito Gale  MRN: [id number]  Admission Date: 11/27/2018  Attending Physician: Reuben Marie MD   Primary Care Provider: FREDDIE Butcher         Patient information was obtained from patient, past medical records and ER records.     Subjective:     Principal Problem:Cellulitis of hand    Chief Complaint:   Chief Complaint   Patient presents with    Cellulitis     to right thumb for a couple of days        HPI: Margarito Gale is a 28 year old male who presented to the ED with complaints of right hand swelling and pain. The patient reports that he was cutting a ziptie while working on his bike outside and cut his hand. He denies that the knife was dirty and has not tried any treatments. He further denies other symptoms including fever. Review of the patient's chart shows that he has a history of recurrent infections including a gluteal abscess in July of 2018 and left elbow cellulitis in May of 2018. In the ED, the patient's WBC count was 25,090 and his blood glucose was mildly elevated to 151.     Past Medical History:   Diagnosis Date    ADD (attention deficit disorder)     Lumbago        Past Surgical History:   Procedure Laterality Date    TONSILLECTOMY         Review of patient's allergies indicates:   Allergen Reactions    Pcn [penicillins] Anaphylaxis    Amoxicillin Hives       No current facility-administered medications on file prior to encounter.      Current Outpatient Medications on File Prior to Encounter   Medication Sig    ibuprofen (ADVIL,MOTRIN) 800 MG tablet Take 1 tablet (800 mg total) by mouth 2 (two) times daily as needed for Pain.    melatonin 5 mg Tab Take by mouth.    naproxen (NAPROSYN) 500 MG tablet Take 1 tablet (500 mg total) by mouth 2 (two) times daily with meals.    ondansetron (ZOFRAN) 4 MG tablet Take 1 tablet (4 mg total) by mouth every 6 (six) hours.    promethazine  (PHENERGAN) 25 MG tablet Take 1 tablet (25 mg total) by mouth every 6 (six) hours as needed for Nausea.    sumatriptan (IMITREX) 50 MG tablet Take 1 tablet (50 mg total) by mouth every 2 (two) hours as needed for Migraine.     Family History     None        Tobacco Use    Smoking status: Current Every Day Smoker     Packs/day: 0.50     Types: Cigarettes   Substance and Sexual Activity    Alcohol use: Yes     Comment: states that he drinks when he feels like it; unable to quantify average amount    Drug use: Yes     Types: Marijuana    Sexual activity: Not on file     Review of Systems   Constitutional: Negative for appetite change, chills, diaphoresis, fatigue and fever.   HENT: Negative for congestion, ear pain, mouth sores, sore throat and trouble swallowing.    Eyes: Negative for pain and visual disturbance.   Respiratory: Negative for cough, chest tightness and shortness of breath.    Cardiovascular: Negative for chest pain, palpitations and leg swelling.   Gastrointestinal: Negative for abdominal pain, constipation, diarrhea and nausea.   Endocrine: Negative for cold intolerance, heat intolerance, polydipsia and polyuria.   Genitourinary: Negative for dysuria, frequency and hematuria.   Musculoskeletal: Positive for myalgias (right hand pain and swelling). Negative for arthralgias, back pain and neck pain.   Skin: Negative for pallor, rash and wound.   Allergic/Immunologic: Negative for environmental allergies and immunocompromised state.   Neurological: Negative for dizziness, seizures, syncope, weakness, numbness and headaches.   Hematological: Negative for adenopathy. Does not bruise/bleed easily.   Psychiatric/Behavioral: Negative for agitation, confusion and sleep disturbance.     Objective:     Vital Signs (Most Recent):  Temp: 100 °F (37.8 °C) (11/27/18 1220)  Pulse: (!) 114 (11/27/18 1220)  Resp: 20 (11/27/18 1220)  BP: (!) 164/83 (11/27/18 1220)  SpO2: 95 % (11/27/18 1220) Vital Signs (24h  Range):  Temp:  [100 °F (37.8 °C)] 100 °F (37.8 °C)  Pulse:  [114] 114  Resp:  [20] 20  SpO2:  [95 %] 95 %  BP: (164)/(83) 164/83     Weight: 68 kg (150 lb)  Body mass index is 23.49 kg/m².    Physical Exam   Constitutional: He is oriented to person, place, and time. He appears well-developed and well-nourished.   HENT:   Head: Normocephalic and atraumatic.   Eyes: Conjunctivae are normal.   Neck: Neck supple. No JVD present.   Cardiovascular: Normal rate, regular rhythm and normal heart sounds.   Pulmonary/Chest: Effort normal and breath sounds normal. He has no wheezes.   Abdominal: Soft. Bowel sounds are normal. He exhibits no distension. There is no tenderness.   Musculoskeletal:        Right hand: He exhibits decreased range of motion, tenderness and swelling.   Neurological: He is alert and oriented to person, place, and time.   Skin: Skin is warm and dry. No rash noted.   Patchy erythema to hand with linear ascending redness.     Psychiatric: He has a normal mood and affect. His behavior is normal. Thought content normal.   Nursing note and vitals reviewed.          Significant Labs:   CBC:   Recent Labs   Lab 11/27/18  1248   WBC 25.09*   HGB 12.5*   HCT 35.8*        CMP:   Recent Labs   Lab 11/27/18  1248   *   K 3.8      CO2 21*   *   BUN 14   CREATININE 1.0   CALCIUM 9.9   PROT 7.5   ALBUMIN 4.3   BILITOT 1.1*   ALKPHOS 86   AST 32   ALT 19   ANIONGAP 12   EGFRNONAA >60     All pertinent labs within the past 24 hours have been reviewed.    Significant Imaging: I have reviewed all pertinent imaging results/findings within the past 24 hours.   Imaging Results          CT Hand With Contrast Right (In process)                X-Ray Hand 3 view Right (Final result)  Result time 11/27/18 12:54:54    Final result by JANIS Patterson Sr., MD (11/27/18 12:54:54)                 Impression:      There is moderate prominence of the soft tissue thickness in the dorsum of the right hand.  This  is characteristic of a cellulitis or soft tissue contusion.      Electronically signed by: Yuan Patterson MD  Date:    11/27/2018  Time:    12:54             Narrative:    EXAMINATION:  XR HAND COMPLETE 3 VIEW RIGHT    CLINICAL HISTORY:  hand pain;    COMPARISON:  None    FINDINGS:  There is no fracture. There is no dislocation.  There is moderate prominence of the soft tissue thickness in the dorsum of the right hand.                               X-Ray Chest AP Portable (Final result)  Result time 11/27/18 12:55:09    Final result by Rui Arteaga MD (11/27/18 12:55:09)                 Impression:      Normal single view of the chest.      Electronically signed by: Rui Arteaga MD  Date:    11/27/2018  Time:    12:55             Narrative:    EXAMINATION:  XR CHEST AP PORTABLE    CLINICAL HISTORY:  Sepsis;    TECHNIQUE:  Single frontal view of the chest was performed.    COMPARISON:  None    FINDINGS:  The lungs are clear, with normal appearance of pulmonary vasculature.  No pleural effusion or pneumothorax.    The cardiac silhouette is normal in size. The hilar and mediastinal contours are unremarkable.                                  Assessment/Plan:     * Cellulitis of hand with sepsis    -IV Vancomycin.   -Check CT hand to rule out abscess.        Hyperglycemia    -Patient with nonfasting hyperglycemia on all measurements in his chart and recurrent infections.  -Check fasting glucose and hemoglobin A1C to further evaluate.           Tobacco abuse    -Patient counseled on cessation.   -Declines nicotine patch.          VTE Risk Mitigation (From admission, onward)        Ordered     Place sequential compression device  Until discontinued      11/27/18 1525     IP VTE LOW RISK PATIENT  Once      11/27/18 1525             ZULAY Garcia  Department of Hospital Medicine   [facility]

## 2018-11-28 LAB
ANION GAP SERPL CALC-SCNC: 7 MMOL/L
BASOPHILS # BLD AUTO: 0 K/UL
BASOPHILS NFR BLD: 0 %
BUN SERPL-MCNC: 10 MG/DL
CALCIUM SERPL-MCNC: 8.9 MG/DL
CHLORIDE SERPL-SCNC: 103 MMOL/L
CK SERPL-CCNC: 121 U/L
CO2 SERPL-SCNC: 25 MMOL/L
CREAT SERPL-MCNC: 0.8 MG/DL
DIFFERENTIAL METHOD: ABNORMAL
EOSINOPHIL # BLD AUTO: 0 K/UL
EOSINOPHIL NFR BLD: 0 %
ERYTHROCYTE [DISTWIDTH] IN BLOOD BY AUTOMATED COUNT: 11.6 %
EST. GFR  (AFRICAN AMERICAN): >60 ML/MIN/1.73 M^2
EST. GFR  (NON AFRICAN AMERICAN): >60 ML/MIN/1.73 M^2
GLUCOSE SERPL-MCNC: 169 MG/DL
HCT VFR BLD AUTO: 32.6 %
HGB BLD-MCNC: 11.3 G/DL
LYMPHOCYTES # BLD AUTO: 0.5 K/UL
LYMPHOCYTES NFR BLD: 2 %
MAGNESIUM SERPL-MCNC: 1.6 MG/DL
MCH RBC QN AUTO: 31 PG
MCHC RBC AUTO-ENTMCNC: 34.7 G/DL
MCV RBC AUTO: 90 FL
MONOCYTES # BLD AUTO: 0.9 K/UL
MONOCYTES NFR BLD: 3.7 %
NEUTROPHILS # BLD AUTO: 22.7 K/UL
NEUTROPHILS NFR BLD: 94.3 %
PHOSPHATE SERPL-MCNC: 3.2 MG/DL
PLATELET # BLD AUTO: 214 K/UL
PMV BLD AUTO: 10 FL
POTASSIUM SERPL-SCNC: 4.3 MMOL/L
RBC # BLD AUTO: 3.64 M/UL
SODIUM SERPL-SCNC: 135 MMOL/L
WBC # BLD AUTO: 24.03 K/UL

## 2018-11-28 PROCEDURE — 63600175 PHARM REV CODE 636 W HCPCS: Performed by: INTERNAL MEDICINE

## 2018-11-28 PROCEDURE — 63600175 PHARM REV CODE 636 W HCPCS: Performed by: EMERGENCY MEDICINE

## 2018-11-28 PROCEDURE — 25000003 PHARM REV CODE 250: Performed by: ANESTHESIOLOGY

## 2018-11-28 PROCEDURE — 94760 N-INVAS EAR/PLS OXIMETRY 1: CPT

## 2018-11-28 PROCEDURE — 84100 ASSAY OF PHOSPHORUS: CPT

## 2018-11-28 PROCEDURE — 82550 ASSAY OF CK (CPK): CPT

## 2018-11-28 PROCEDURE — 80048 BASIC METABOLIC PNL TOTAL CA: CPT

## 2018-11-28 PROCEDURE — 83735 ASSAY OF MAGNESIUM: CPT

## 2018-11-28 PROCEDURE — 85025 COMPLETE CBC W/AUTO DIFF WBC: CPT

## 2018-11-28 PROCEDURE — 25000003 PHARM REV CODE 250: Performed by: EMERGENCY MEDICINE

## 2018-11-28 PROCEDURE — 80202 ASSAY OF VANCOMYCIN: CPT

## 2018-11-28 PROCEDURE — A4216 STERILE WATER/SALINE, 10 ML: HCPCS | Performed by: ANESTHESIOLOGY

## 2018-11-28 PROCEDURE — 21400001 HC TELEMETRY ROOM

## 2018-11-28 PROCEDURE — 36415 COLL VENOUS BLD VENIPUNCTURE: CPT

## 2018-11-28 RX ADMIN — Medication 3 ML: at 12:11

## 2018-11-28 RX ADMIN — Medication 3 ML: at 01:11

## 2018-11-28 RX ADMIN — KETOROLAC TROMETHAMINE 30 MG: 30 INJECTION INTRAMUSCULAR; INTRAVENOUS at 12:11

## 2018-11-28 RX ADMIN — VANCOMYCIN HYDROCHLORIDE 1000 MG: 1 INJECTION, POWDER, LYOPHILIZED, FOR SOLUTION INTRAVENOUS at 01:11

## 2018-11-28 RX ADMIN — MORPHINE SULFATE 4 MG: 4 INJECTION, SOLUTION INTRAMUSCULAR; INTRAVENOUS at 09:11

## 2018-11-28 RX ADMIN — Medication 3 ML: at 09:11

## 2018-11-28 RX ADMIN — Medication 3 ML: at 10:11

## 2018-11-28 RX ADMIN — OXYCODONE HYDROCHLORIDE 5 MG: 5 TABLET ORAL at 09:11

## 2018-11-28 RX ADMIN — MORPHINE SULFATE 4 MG: 4 INJECTION, SOLUTION INTRAMUSCULAR; INTRAVENOUS at 03:11

## 2018-11-28 RX ADMIN — OXYCODONE HYDROCHLORIDE 5 MG: 5 TABLET ORAL at 06:11

## 2018-11-28 RX ADMIN — KETOROLAC TROMETHAMINE 30 MG: 30 INJECTION INTRAMUSCULAR; INTRAVENOUS at 06:11

## 2018-11-28 NOTE — ANESTHESIA RELEASE NOTE
"Anesthesia Release from PACU Note    Patient: Margarito Gale    Procedure(s) Performed: Procedure(s) (LRB):  INCISION AND DRAINAGE, HAND (Right)  FASCIOTOMY, HAND (Right)  RELEASE, CARPAL TUNNEL (Right)    Anesthesia type: general    Post pain: Adequate analgesia    Post assessment: no apparent anesthetic complications, tolerated procedure well and no evidence of recall    Last Vitals:   Visit Vitals  /63 (BP Location: Right leg, Patient Position: Lying)   Pulse 108   Temp 36.6 °C (97.9 °F) (Temporal)   Resp 20   Ht 5' 7" (1.702 m)   Wt 63.3 kg (139 lb 8.8 oz)   SpO2 97%   BMI 21.86 kg/m²       Post vital signs: stable    Level of consciousness: awake, alert  and oriented    Nausea/Vomiting: no nausea/no vomiting    Complications: none    Airway Patency: patent    Respiratory: unassisted, spontaneous ventilation, room air    Cardiovascular: stable and blood pressure at baseline    Hydration: euvolemic  "

## 2018-11-28 NOTE — ASSESSMENT & PLAN NOTE
IV Vancomycin.  CT hand shows soft tissue swelling without gas or abscess.  Evaluated by Orthopedic Surgery and taken to the OR 27 November for I&D and fasciotomy.  Wound and blood cultures pending.

## 2018-11-28 NOTE — PLAN OF CARE
Problem: Patient Care Overview  Goal: Plan of Care Review  Outcome: Ongoing (interventions implemented as appropriate)  POC reviewed, verbalized understanding. Pt remained free from falls, fall precautions in place. Pt is SR on monitor, 60s-70s. VSS. No other c/o at this time. PRN given. PIV intact. Call bell and personal belongings within reach. Hourly rounding complete. Reminded to call for assistance. Will continue to monitor.

## 2018-11-28 NOTE — PROGRESS NOTES
Orthopedic Sports Surgery Rounding Note    2018    Patient seen and examined. At beginning of visit, sleeping comfortably. Passive motion of fingers without pain. Palpation of forearm compartment elicits pain response and awakens. Dressing C/D/I.     Vital Signs:    Vitals:    18 0500 18 0707 18 0918 18 1228   BP:  (!) 101/58  111/65   BP Location:  Left arm  Right arm   Patient Position:  Lying  Lying   Pulse: 62 61 66 77   Resp:  16  16   Temp:  98.2 °F (36.8 °C)  97.7 °F (36.5 °C)   TempSrc:  Oral  Oral   SpO2:  98% 98% 98%   Weight:       Height:           Temp (48hrs), Av.5 °F (36.9 °C), Min:97.8 °F (36.6 °C), Max:100 °F (37.8 °C)      Recent Lab Results:  Recent Labs   Lab 18  1248 18  0534   * 135*   K 3.8 4.3    103   CO2 21* 25   BUN 14 10   CREATININE 1.0 0.8   * 169*   CALCIUM 9.9 8.9     Recent Labs     18  1248 18  0534   WBC 25.09* 24.03*   HGB 12.5* 11.3*   HCT 35.8* 32.6*    214         Physical Exam:  A/O *3. No acute distress    Right Upper Extremity  Dressing/Incision C/D/I  Sensation Present  Cap refill less than 2 sec  Compartment soft, Tender to palpation  Finger flexion/extension present    Assessment:  Right Cellulitis of Hand/Forearm with Sepsis s/pI&D, Volar and Dorsal Fasciotomies of Forearm/Hand   , POD # 1    Plan:  Pain Controlled  PT/OT  DVT prophylaxis  Right UE NWB  Wound Care BID  Antibiotics per Hospital team   Repeat I&D planned for 18  Possible wound closure.    Jose Brandon PA-C  Orthopedic Surgery Sports Medicine

## 2018-11-28 NOTE — ANESTHESIA PREPROCEDURE EVALUATION
11/27/2018  Margarito Gale is a 28 y.o., male.    Pre-op Assessment    I have reviewed the Patient Summary Reports.     I have reviewed the Nursing Notes.   I have reviewed the Medications.     Review of Systems  Anesthesia Hx:  Neg history of prior surgery. Denies Family Hx of Anesthesia complications.   Denies Personal Hx of Anesthesia complications.   Social:  Smoker, Alcohol Use    Hematology/Oncology:  Hematology Normal   Oncology Normal     EENT/Dental:EENT/Dental Normal   Cardiovascular:  Cardiovascular Normal     Pulmonary:  Pulmonary Normal    Renal/:  Renal/ Normal     Hepatic/GI:  Hepatic/GI Normal    Musculoskeletal:  Musculoskeletal Normal    Neurological:  Neurology Normal    Endocrine:  Endocrine Normal    Psych:   ADHD         Physical Exam  General:  Well nourished    Airway/Jaw/Neck:  Airway Findings: Mouth Opening: Normal Tongue: Normal  General Airway Assessment: Adult  Mallampati: III  Improves to II with phonation.  TM Distance: Normal, at least 6 cm  Jaw/Neck Findings:  Neck Findings:  Full Beard impairing airway assessment       Chest/Lungs:  Chest/Lungs Findings: Normal Respiratory Rate     Heart/Vascular:  Heart Findings: Rate: Normal        Mental Status:  Mental Status Findings:  Cooperative         Anesthesia Plan  Type of Anesthesia, risks & benefits discussed:  Anesthesia Type:  general  Patient's Preference:   Intra-op Monitoring Plan: standard ASA monitors  Intra-op Monitoring Plan Comments:   Post Op Pain Control Plan:   Post Op Pain Control Plan Comments:   Induction:   IV  Beta Blocker:  Patient is not currently on a Beta-Blocker (No further documentation required).       Informed Consent: Patient understands risks and agrees with Anesthesia plan.  Questions answered. Anesthesia consent signed with patient.  ASA Score: 1  emergent   Day of Surgery Review of  History & Physical:

## 2018-11-28 NOTE — PLAN OF CARE
Met with patient at bedside to complete discharge planning assessment. Confirmed demographics and contact list. Patient reports he is independent with ADL's and lives at home with his mother. Patient reports that he does not have PCP because he is not typically sick. He reports he will go to clinic or urgent care if he feels sick. Patient denies any post hospital needs or services at this time and is anticipating d/c tomorrow after his 2nd procedure scheduled on his arm. Patient reports that he is frustrated because his keys were lost when transported from ED to Tele floor. CM notified charge nurse, Jazmine Manuel RN of patients concerns. Charge nurse to follow up. Provided Transitional Care Folder with information on Advance Directives, Discharge Planning Begins on Admission pamphlet, and [facility] Bedside Delivery program. Updated patient white board with current d/c plan and CM contact information. Patient reports that his preferred pharmacy is [facility]. Provided list of health and pharmacy resources for uninsured. Patient reports that he does have insurance and is covered by [facility] PPO. Patient showed card to CM and reports that he provided card at time of admission. CM confirmed that card is scanned into Epic. CM emailed Ramya Sung with Hospital Registration, Yuridia Trejo CM , Marita Domingo CM Director, and Hannah Mason RN Utilization Mgmt to assist with insurance question.          11/28/18 [facility]   Discharge Assessment   Assessment Type Discharge Planning Assessment   Confirmed/corrected address and phone number on facesheet? Yes   Assessment information obtained from? Patient   Expected Length of Stay (days) 1   Communicated expected length of stay with patient/caregiver yes   Prior to hospitilization cognitive status: Alert/Oriented   Prior to hospitalization functional status: Independent   Current cognitive status: Alert/Oriented   Current Functional Status: Independent    Lives With parent(s)   Able to Return to Prior Arrangements yes   Is patient able to care for self after discharge? Yes   Who are your caregiver(s) and their phone number(s)? Lena Gale, mother [phone]; Maxi Mark, friend [phone]   Readmission Within The Last 30 Days no previous admission in last 30 days   Patient currently being followed by outpatient case management? No   Patient currently receives any other outside agency services? No   Equipment Currently Used at Home none   Do you have any problems affording any of your prescribed medications? No   Does the patient have transportation home? Yes   Transportation Available car;family or friend will provide   Does the patient receive services at the [facility]? No   Discharge Plan A Home;Home with family   Patient/Family In Agreement With Plan yes
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
## 2018-11-28 NOTE — NURSING
Pt back from surgery via stretcher. Report received from MILI Alvarez in PACU. Pt in no acute distress. Back on tele monitor.

## 2018-11-28 NOTE — TRANSFER OF CARE
"Anesthesia Transfer of Care Note    Patient: Margarito Gale    Procedure(s) Performed: Procedure(s) (LRB):  INCISION AND DRAINAGE, HAND (Right)  FASCIOTOMY, HAND (Right)  RELEASE, CARPAL TUNNEL (Right)    Patient location: PACU    Anesthesia Type: general    Transport from OR: Transported from OR on room air with adequate spontaneous ventilation    Post pain: adequate analgesia    Post assessment: no apparent anesthetic complications and tolerated procedure well    Post vital signs: stable    Level of consciousness: awake    Nausea/Vomiting: no nausea/vomiting    Complications: none    Transfer of care protocol was followed      Last vitals:   Visit Vitals  /63 (BP Location: Right leg, Patient Position: Lying)   Pulse 108   Temp 36.6 °C (97.9 °F) (Temporal)   Resp 20   Ht 5' 7" (1.702 m)   Wt 63.3 kg (139 lb 8.8 oz)   SpO2 97%   BMI 21.86 kg/m²     "

## 2018-11-28 NOTE — SUBJECTIVE & OBJECTIVE
Interval History:  Returned from OR last night for I&D and fasciotomy of the right hand.  Right hand and forearm pain.    Review of Systems   Constitutional: Negative.  Negative for chills and fever.   HENT: Negative.  Negative for congestion and sore throat.    Eyes: Negative.  Negative for visual disturbance.   Respiratory: Negative.  Negative for cough, shortness of breath and wheezing.    Cardiovascular: Negative.  Negative for chest pain.   Gastrointestinal: Negative for abdominal pain, diarrhea, nausea and vomiting.   Endocrine: Negative.    Genitourinary: Negative.    Musculoskeletal: Positive for arthralgias and joint swelling. Negative for myalgias and neck stiffness.   Skin: Positive for color change and wound. Negative for pallor.   Allergic/Immunologic: Negative.    Neurological: Negative.    Hematological: Negative.    Psychiatric/Behavioral: Negative.    All other systems reviewed and are negative.    Objective:     Vital Signs (Most Recent):  Temp: 97.7 °F (36.5 °C) (11/28/18 1228)  Pulse: 77 (11/28/18 1228)  Resp: 16 (11/28/18 1228)  BP: 111/65 (11/28/18 1228)  SpO2: 98 % (11/28/18 1228) Vital Signs (24h Range):  Temp:  [97.7 °F (36.5 °C)-99.4 °F (37.4 °C)] 97.7 °F (36.5 °C)  Pulse:  [] 77  Resp:  [11-20] 16  SpO2:  [96 %-100 %] 98 %  BP: (101-141)/(57-82) 111/65     Weight: 63.3 kg (139 lb 8.8 oz)  Body mass index is 21.86 kg/m².    Intake/Output Summary (Last 24 hours) at 11/28/2018 1236  Last data filed at 11/28/2018 0131  Gross per 24 hour   Intake 2250 ml   Output --   Net 2250 ml      Physical Exam   Constitutional: He is oriented to person, place, and time. He appears well-developed and well-nourished. No distress.   HENT:   Head: Normocephalic and atraumatic.   Mouth/Throat: Oropharynx is clear and moist.   Eyes: Conjunctivae and EOM are normal. Pupils are equal, round, and reactive to light.   Neck: No JVD present. No thyromegaly present.   Cardiovascular: Normal rate, regular rhythm  and normal heart sounds. Exam reveals no gallop and no friction rub.   No murmur heard.  Pulmonary/Chest: Effort normal and breath sounds normal. No respiratory distress. He has no wheezes. He has no rales.   Abdominal: Soft. Bowel sounds are normal. He exhibits no distension. There is no tenderness. There is no rebound and no guarding.   Musculoskeletal: Normal range of motion. He exhibits no edema or tenderness.   Lymphadenopathy:     He has no cervical adenopathy.   Neurological: He is alert and oriented to person, place, and time. He has normal reflexes. He displays normal reflexes. No cranial nerve deficit.   Skin: Skin is warm and dry. Capillary refill takes less than 2 seconds. No rash noted. He is not diaphoretic. There is erythema. There is pallor.   Right hand and forearm dressed and wrapped.  Fingertips warm with normal capillary refill.   Psychiatric: He has a normal mood and affect. His behavior is normal. Judgment and thought content normal.       Significant Labs: All pertinent labs within the past 24 hours have been reviewed.    Significant Imaging: I have reviewed all pertinent imaging results/findings within the past 24 hours.

## 2018-11-28 NOTE — PLAN OF CARE
POC discussed with patient, patient verbalizes understanding.  Patient states he is in no pain, is hungry, and wants to go to his room

## 2018-11-28 NOTE — PROGRESS NOTES
[facility] Hospital Medicine  Progress Note    Patient Name: Margarito Gale  MRN: [id number]  Patient Class: IP- Inpatient   Admission Date: 11/27/2018  Length of Stay: 0 days  Attending Physician: Pan Lorenzo MD  Primary Care Provider: FREDDIE Butcher        Subjective:     Principal Problem:Cellulitis of hand    HPI:  Margarito Gale is a 28 year old male who presented to the ED with complaints of right hand swelling and pain. The patient reports that he was cutting a ziptie while working on his bike outside and cut his hand. He denies that the knife was dirty and has not tried any treatments. He further denies other symptoms including fever. Review of the patient's chart shows that he has a history of recurrent infections including a gluteal abscess in July of 2018 and left elbow cellulitis in May of 2018. In the ED, the patient's WBC count was 25,090 and his blood glucose was mildly elevated to 151.     Hospital Course:  Admitted for evaluation and treatment of cellulitis of the right hand and forearm.  Empirically treated with Vancomycin.  27 November taken to the OR by orthopedic surgery for I&D and fasciotomy.    Interval History:  Returned from OR last night for I&D and fasciotomy of the right hand.  Right hand and forearm pain.    Review of Systems   Constitutional: Negative.  Negative for chills and fever.   HENT: Negative.  Negative for congestion and sore throat.    Eyes: Negative.  Negative for visual disturbance.   Respiratory: Negative.  Negative for cough, shortness of breath and wheezing.    Cardiovascular: Negative.  Negative for chest pain.   Gastrointestinal: Negative for abdominal pain, diarrhea, nausea and vomiting.   Endocrine: Negative.    Genitourinary: Negative.    Musculoskeletal: Positive for arthralgias and joint swelling. Negative for myalgias and neck stiffness.   Skin: Positive for color change and wound. Negative for pallor.    Allergic/Immunologic: Negative.    Neurological: Negative.    Hematological: Negative.    Psychiatric/Behavioral: Negative.    All other systems reviewed and are negative.    Objective:     Vital Signs (Most Recent):  Temp: 97.7 °F (36.5 °C) (11/28/18 1228)  Pulse: 77 (11/28/18 1228)  Resp: 16 (11/28/18 1228)  BP: 111/65 (11/28/18 1228)  SpO2: 98 % (11/28/18 1228) Vital Signs (24h Range):  Temp:  [97.7 °F (36.5 °C)-99.4 °F (37.4 °C)] 97.7 °F (36.5 °C)  Pulse:  [] 77  Resp:  [11-20] 16  SpO2:  [96 %-100 %] 98 %  BP: (101-141)/(57-82) 111/65     Weight: 63.3 kg (139 lb 8.8 oz)  Body mass index is 21.86 kg/m².    Intake/Output Summary (Last 24 hours) at 11/28/2018 1236  Last data filed at 11/28/2018 0131  Gross per 24 hour   Intake 2250 ml   Output --   Net 2250 ml      Physical Exam   Constitutional: He is oriented to person, place, and time. He appears well-developed and well-nourished. No distress.   HENT:   Head: Normocephalic and atraumatic.   Mouth/Throat: Oropharynx is clear and moist.   Eyes: Conjunctivae and EOM are normal. Pupils are equal, round, and reactive to light.   Neck: No JVD present. No thyromegaly present.   Cardiovascular: Normal rate, regular rhythm and normal heart sounds. Exam reveals no gallop and no friction rub.   No murmur heard.  Pulmonary/Chest: Effort normal and breath sounds normal. No respiratory distress. He has no wheezes. He has no rales.   Abdominal: Soft. Bowel sounds are normal. He exhibits no distension. There is no tenderness. There is no rebound and no guarding.   Musculoskeletal: Normal range of motion. He exhibits no edema or tenderness.   Lymphadenopathy:     He has no cervical adenopathy.   Neurological: He is alert and oriented to person, place, and time. He has normal reflexes. He displays normal reflexes. No cranial nerve deficit.   Skin: Skin is warm and dry. Capillary refill takes less than 2 seconds. No rash noted. He is not diaphoretic. There is erythema.  There is pallor.   Right hand and forearm dressed and wrapped.  Fingertips warm with normal capillary refill.   Psychiatric: He has a normal mood and affect. His behavior is normal. Judgment and thought content normal.       Significant Labs: All pertinent labs within the past 24 hours have been reviewed.    Significant Imaging: I have reviewed all pertinent imaging results/findings within the past 24 hours.    Assessment/Plan:      * Cellulitis of hand with sepsis    IV Vancomycin.  CT hand shows soft tissue swelling without gas or abscess.  Evaluated by Orthopedic Surgery and taken to the OR 27 November for I&D and fasciotomy.  Wound and blood cultures pending.     Tobacco abuse    -Patient counseled on cessation.   -Declines nicotine patch.        Hyperglycemia    -Patient with nonfasting hyperglycemia on all measurements in his chart and recurrent infections.  -Check fasting glucose and hemoglobin A1C to further evaluate.             VTE Risk Mitigation (From admission, onward)        Ordered     Place sequential compression device  Until discontinued      11/27/18 1525     IP VTE LOW RISK PATIENT  Once      11/27/18 1525              Pan Lorenzo MD  Department of Hospital Medicine   [facility]

## 2018-11-28 NOTE — PROGRESS NOTES
[facility]  Orthopedics  Consult Note    Patient Name: Margarito Gale  MRN: [id number]  Admission Date: 11/27/2018  Hospital Length of Stay: 0 days  Attending Provider: Pan Lorenzo MD  Primary Care Provider: FREDDIE Butcher    Patient information was obtained from patient and ER records.     Consults  Subjective:     Principal Problem:Cellulitis of hand    Chief Complaint:   Chief Complaint   Patient presents with    Cellulitis     to right thumb for a couple of days        HPI: Margarito Gale is a 28 year old male who presented to the ED with complaints of right hand swelling and pain. The patient reports that he was cutting a ziptie while working on his bike outside and cut his hand. He denies that the knife was dirty and has not tried any treatments. He further denies other symptoms including fever. Review of the patient's chart shows that he has a history of recurrent infections including a gluteal abscess in July of 2018 and left elbow cellulitis in May of 2018. In the ED, the patient's WBC count was 25,090 and his blood glucose was mildly elevated to 151. Per patient he has acutely worsened over the last day. He complains of pain and swelling into forearm.        Past Medical History:   Diagnosis Date    ADD (attention deficit disorder)     Lumbago        Past Surgical History:   Procedure Laterality Date    TONSILLECTOMY         Review of patient's allergies indicates:   Allergen Reactions    Pcn [penicillins] Anaphylaxis    Amoxicillin Hives       Current Facility-Administered Medications   Medication    acetaminophen tablet 650 mg    HYDROcodone-acetaminophen 5-325 mg per tablet 1 tablet    ketorolac injection 30 mg    morphine injection 2 mg    morphine injection 4 mg    ondansetron tablet 4 mg    promethazine tablet 25 mg    [START ON 11/28/2018] vancomycin in dextrose 5 % 1 gram/250 mL IVPB 1,000 mg     Family History     None        Tobacco Use     "Smoking status: Current Every Day Smoker     Packs/day: 0.50     Types: Cigarettes   Substance and Sexual Activity    Alcohol use: Yes     Comment: states that he drinks when he feels like it; unable to quantify average amount    Drug use: Yes     Types: Marijuana    Sexual activity: Not on file     Review of Systems   Constitution: Negative for chills, diaphoresis, fever and weakness.   HENT: Negative for congestion and sore throat.    Cardiovascular: Negative for chest pain and palpitations.   Respiratory: Negative for cough and shortness of breath.    Skin: Positive for color change. Negative for nail changes.   Musculoskeletal: Positive for joint pain and joint swelling. Negative for muscle weakness.   Gastrointestinal: Negative for abdominal pain, nausea and vomiting.   Neurological: Negative for headaches.     Objective:     Vital Signs (Most Recent):  Temp: 98.4 °F (36.9 °C) (11/27/18 1624)  Pulse: (!) 111 (11/27/18 1742)  Resp: 18 (11/27/18 1624)  BP: 111/75 (11/27/18 1624)  SpO2: 97 % (11/27/18 1624) Vital Signs (24h Range):  Temp:  [98.4 °F (36.9 °C)-100 °F (37.8 °C)] 98.4 °F (36.9 °C)  Pulse:  [100-114] 111  Resp:  [17-20] 18  SpO2:  [95 %-97 %] 97 %  BP: (111-164)/(75-83) 111/75     Weight: 63.3 kg (139 lb 8.8 oz)  Height: 5' 7" (170.2 cm)  Body mass index is 21.86 kg/m².    No intake or output data in the 24 hours ending 11/27/18 1946    General    Constitutional: He is oriented to person, place, and time. He appears well-developed and well-nourished. He appears distressed.   HENT:   Head: Normocephalic and atraumatic.   Right Ear: External ear normal.   Left Ear: External ear normal.   Nose: Nose normal.   Eyes: EOM are normal. Pupils are equal, round, and reactive to light. Right eye exhibits no discharge. Left eye exhibits no discharge.   Neck: Normal range of motion.   Cardiovascular: Intact distal pulses.    Pulmonary/Chest: Effort normal. No respiratory distress.   Abdominal: Soft. "   Neurological: He is alert and oriented to person, place, and time.   Psychiatric: He has a normal mood and affect. His behavior is normal. Judgment and thought content normal.             Right Hand/Wrist Exam     Inspection   Scars: Wrist - absent Hand -  absent  Effusion: Wrist - present Hand -  present  Bruising: Wrist - absent Hand -  absent  Deformity: Wrist - deformity Hand -  deformity    Comments:  RUE exam limited secondary to cooperation    Moderate Swelling hand/distal forearm  Wound with erythema present to Dorsal thumb  Pain with palpation of hand/distal forearm. +induration  Compartment firm, compressible  Hand/Forearm  2+ pulses distally   Normal capillary refill    Endorses sensation          Vascular Exam       Capillary Refill  Right Hand: normal capillary refill  Left Hand: normal capillary refill      Significant Labs:   CBC:   Recent Labs   Lab 11/27/18  1248   WBC 25.09*   HGB 12.5*   HCT 35.8*        CMP:   Recent Labs   Lab 11/27/18  1248   *   K 3.8      CO2 21*   *   BUN 14   CREATININE 1.0   CALCIUM 9.9   PROT 7.5   ALBUMIN 4.3   BILITOT 1.1*   ALKPHOS 86   AST 32   ALT 19   ANIONGAP 12   EGFRNONAA >60     CRP: No results for input(s): CRP in the last 48 hours.  Lactic Acid:   Recent Labs   Lab 11/27/18  1248   LACTATE 1.7     All pertinent labs within the past 24 hours have been reviewed.    Significant Imaging: CT: I have reviewed all pertinent results/findings and my personal findings are:  There is a prominent amount of edema in the soft tissue of the thumb and in the dorsum of the hand.  This is consistent with the patient's history and characteristic of cellulitis.  There is no abscess visualized.    Assessment/Plan:     Active Diagnoses:    Diagnosis Date Noted POA    PRINCIPAL PROBLEM:  Cellulitis of hand with sepsis [L03.119] 11/27/2018 Yes    Hyperglycemia [R73.9] 11/27/2018 Yes    Tobacco abuse [Z72.0] 11/27/2018 Yes      Problems Resolved During  this Admission:     Worsening pain and swelling to right upper extremity since admission.    We reviewed with Margarito today, the pathology and natural history of his diagnosis. We have discussed a variety of treatment options including medications, physical therapy and other alternative treatments. I also explained the indications, risks and benefits of surgery. After discussion, Margarito decided to proceed with surgery. The decision was made to go forward with    1. Incision and drainage right hand   2. Possible right hand/forearm fasciotomies    The details of the surgical procedure were explained, including the location of probable incisions and a description of likely hardware and/or grafts to be used.  The patient understands the likely convalescence after surgery.  Also, we have thoroughly discussed the risks, benefits and alternatives to surgery, including, but not limited to, the risk of infection, joint stiffness, blood clot (including DVT and/or pulmonary embolus), neurologic and vascular injury.  It was explained that, if tissue has been repaired or reconstructed, there is a chance of failure, which may require further management.      All of the patient's questions were answered and informed consent was obtained. The patient will contact us if they have any questions or concerns in the interim.          Thank you for your consult.    Jose Brandon PA-C  Orthopedics  [facility]

## 2018-11-28 NOTE — HOSPITAL COURSE
Admitted for evaluation and treatment of cellulitis of the right hand and forearm.  Empirically treated with Vancomycin.  27 November taken to the OR by orthopedic surgery for I&D and fasciotomy.  Return to OR for closure and wound VAC placement 29 November.  Cultures grew S.pyogenes and S. Aureus.  Sensitivities pending.  Continuing Vancomycin.     11/30/18 POD #1 will need extended care with wound vac due to complex nature of wound closure.   Seen by General surgery who recommends outpatient follow up for skin graft. Will need to arrange dressing change appointments with nancy nick after wound vac has been approved. Nonweightbearing to right UE.     Patient subsequently left AMA in the middle of the night. AMA paperwork was signed. IV catheter and wound vac were discontinued.

## 2018-11-29 ENCOUNTER — ANESTHESIA (OUTPATIENT)
Dept: SURGERY | Facility: HOSPITAL | Age: 28
DRG: 854 | End: 2018-11-29
Payer: MEDICAID

## 2018-11-29 ENCOUNTER — ANESTHESIA EVENT (OUTPATIENT)
Dept: SURGERY | Facility: HOSPITAL | Age: 28
DRG: 854 | End: 2018-11-29
Payer: MEDICAID

## 2018-11-29 PROBLEM — Z98.890 H/O FASCIOTOMY: Status: ACTIVE | Noted: 2018-11-29

## 2018-11-29 LAB
ANION GAP SERPL CALC-SCNC: 7 MMOL/L
BASOPHILS # BLD AUTO: 0.03 K/UL
BASOPHILS NFR BLD: 0.2 %
BUN SERPL-MCNC: 12 MG/DL
CALCIUM SERPL-MCNC: 8.7 MG/DL
CHLORIDE SERPL-SCNC: 106 MMOL/L
CO2 SERPL-SCNC: 25 MMOL/L
CREAT SERPL-MCNC: 0.8 MG/DL
DIFFERENTIAL METHOD: ABNORMAL
EOSINOPHIL # BLD AUTO: 0.6 K/UL
EOSINOPHIL NFR BLD: 2.8 %
ERYTHROCYTE [DISTWIDTH] IN BLOOD BY AUTOMATED COUNT: 12.7 %
EST. GFR  (AFRICAN AMERICAN): >60 ML/MIN/1.73 M^2
EST. GFR  (NON AFRICAN AMERICAN): >60 ML/MIN/1.73 M^2
GLUCOSE SERPL-MCNC: 120 MG/DL
HCT VFR BLD AUTO: 29.9 %
HGB BLD-MCNC: 10.2 G/DL
LYMPHOCYTES # BLD AUTO: 2 K/UL
LYMPHOCYTES NFR BLD: 9.9 %
MAGNESIUM SERPL-MCNC: 1.4 MG/DL
MCH RBC QN AUTO: 30.4 PG
MCHC RBC AUTO-ENTMCNC: 34.1 G/DL
MCV RBC AUTO: 89 FL
MONOCYTES # BLD AUTO: 1 K/UL
MONOCYTES NFR BLD: 5.1 %
NEUTROPHILS # BLD AUTO: 16.4 K/UL
NEUTROPHILS NFR BLD: 82 %
PHOSPHATE SERPL-MCNC: 2.5 MG/DL
PLATELET # BLD AUTO: 255 K/UL
PMV BLD AUTO: 10.2 FL
POTASSIUM SERPL-SCNC: 3.7 MMOL/L
RBC # BLD AUTO: 3.35 M/UL
SODIUM SERPL-SCNC: 138 MMOL/L
VANCOMYCIN TROUGH SERPL-MCNC: 9.7 UG/ML
WBC # BLD AUTO: 20.61 K/UL

## 2018-11-29 PROCEDURE — 26010 DRAINAGE OF FINGER ABSCESS: CPT | Mod: 58,F5,, | Performed by: ORTHOPAEDIC SURGERY

## 2018-11-29 PROCEDURE — 0HQFXZZ REPAIR RIGHT HAND SKIN, EXTERNAL APPROACH: ICD-10-PCS | Performed by: ORTHOPAEDIC SURGERY

## 2018-11-29 PROCEDURE — 83735 ASSAY OF MAGNESIUM: CPT

## 2018-11-29 PROCEDURE — 37000009 HC ANESTHESIA EA ADD 15 MINS: Performed by: ORTHOPAEDIC SURGERY

## 2018-11-29 PROCEDURE — 63600175 PHARM REV CODE 636 W HCPCS: Performed by: ANESTHESIOLOGY

## 2018-11-29 PROCEDURE — 36415 COLL VENOUS BLD VENIPUNCTURE: CPT

## 2018-11-29 PROCEDURE — 97605 NEG PRS WND THER DME<=50SQCM: CPT | Mod: 59,,, | Performed by: ORTHOPAEDIC SURGERY

## 2018-11-29 PROCEDURE — 63600175 PHARM REV CODE 636 W HCPCS: Performed by: NURSE ANESTHETIST, CERTIFIED REGISTERED

## 2018-11-29 PROCEDURE — 80048 BASIC METABOLIC PNL TOTAL CA: CPT

## 2018-11-29 PROCEDURE — 84100 ASSAY OF PHOSPHORUS: CPT

## 2018-11-29 PROCEDURE — 94760 N-INVAS EAR/PLS OXIMETRY 1: CPT

## 2018-11-29 PROCEDURE — 36000707: Performed by: ORTHOPAEDIC SURGERY

## 2018-11-29 PROCEDURE — 85025 COMPLETE CBC W/AUTO DIFF WBC: CPT

## 2018-11-29 PROCEDURE — 36000706: Performed by: ORTHOPAEDIC SURGERY

## 2018-11-29 PROCEDURE — 71000033 HC RECOVERY, INTIAL HOUR: Performed by: ORTHOPAEDIC SURGERY

## 2018-11-29 PROCEDURE — 25000003 PHARM REV CODE 250: Performed by: NURSE ANESTHETIST, CERTIFIED REGISTERED

## 2018-11-29 PROCEDURE — 37000008 HC ANESTHESIA 1ST 15 MINUTES: Performed by: ORTHOPAEDIC SURGERY

## 2018-11-29 PROCEDURE — 25000003 PHARM REV CODE 250: Performed by: ANESTHESIOLOGY

## 2018-11-29 PROCEDURE — 0H9FXZZ DRAINAGE OF RIGHT HAND SKIN, EXTERNAL APPROACH: ICD-10-PCS | Performed by: ORTHOPAEDIC SURGERY

## 2018-11-29 PROCEDURE — 21400001 HC TELEMETRY ROOM

## 2018-11-29 PROCEDURE — 63600175 PHARM REV CODE 636 W HCPCS: Performed by: INTERNAL MEDICINE

## 2018-11-29 PROCEDURE — 0HQDXZZ REPAIR RIGHT LOWER ARM SKIN, EXTERNAL APPROACH: ICD-10-PCS | Performed by: ORTHOPAEDIC SURGERY

## 2018-11-29 PROCEDURE — 25000003 PHARM REV CODE 250: Performed by: INTERNAL MEDICINE

## 2018-11-29 PROCEDURE — 63600175 PHARM REV CODE 636 W HCPCS: Performed by: EMERGENCY MEDICINE

## 2018-11-29 RX ORDER — PROPOFOL 10 MG/ML
VIAL (ML) INTRAVENOUS
Status: DISCONTINUED | OUTPATIENT
Start: 2018-11-29 | End: 2018-11-29

## 2018-11-29 RX ORDER — ROCURONIUM BROMIDE 10 MG/ML
INJECTION, SOLUTION INTRAVENOUS
Status: DISCONTINUED | OUTPATIENT
Start: 2018-11-29 | End: 2018-11-29

## 2018-11-29 RX ORDER — DEXAMETHASONE SODIUM PHOSPHATE 4 MG/ML
INJECTION, SOLUTION INTRA-ARTICULAR; INTRALESIONAL; INTRAMUSCULAR; INTRAVENOUS; SOFT TISSUE
Status: DISCONTINUED | OUTPATIENT
Start: 2018-11-29 | End: 2018-11-29

## 2018-11-29 RX ORDER — MIDAZOLAM HYDROCHLORIDE 1 MG/ML
INJECTION, SOLUTION INTRAMUSCULAR; INTRAVENOUS
Status: DISCONTINUED | OUTPATIENT
Start: 2018-11-29 | End: 2018-11-29

## 2018-11-29 RX ORDER — FENTANYL CITRATE 50 UG/ML
INJECTION, SOLUTION INTRAMUSCULAR; INTRAVENOUS
Status: DISCONTINUED | OUTPATIENT
Start: 2018-11-29 | End: 2018-11-29

## 2018-11-29 RX ORDER — SUCCINYLCHOLINE CHLORIDE 20 MG/ML
INJECTION INTRAMUSCULAR; INTRAVENOUS
Status: DISCONTINUED | OUTPATIENT
Start: 2018-11-29 | End: 2018-11-29

## 2018-11-29 RX ORDER — ONDANSETRON 2 MG/ML
INJECTION INTRAMUSCULAR; INTRAVENOUS
Status: DISCONTINUED | OUTPATIENT
Start: 2018-11-29 | End: 2018-11-29

## 2018-11-29 RX ORDER — LIDOCAINE HYDROCHLORIDE 10 MG/ML
INJECTION INFILTRATION; PERINEURAL
Status: DISCONTINUED | OUTPATIENT
Start: 2018-11-29 | End: 2018-11-29

## 2018-11-29 RX ORDER — SODIUM CHLORIDE, SODIUM LACTATE, POTASSIUM CHLORIDE, CALCIUM CHLORIDE 600; 310; 30; 20 MG/100ML; MG/100ML; MG/100ML; MG/100ML
INJECTION, SOLUTION INTRAVENOUS CONTINUOUS PRN
Status: DISCONTINUED | OUTPATIENT
Start: 2018-11-29 | End: 2018-11-29

## 2018-11-29 RX ADMIN — FENTANYL CITRATE 25 MCG: 50 INJECTION, SOLUTION INTRAMUSCULAR; INTRAVENOUS at 05:11

## 2018-11-29 RX ADMIN — SODIUM CHLORIDE, SODIUM LACTATE, POTASSIUM CHLORIDE, AND CALCIUM CHLORIDE: 600; 310; 30; 20 INJECTION, SOLUTION INTRAVENOUS at 03:11

## 2018-11-29 RX ADMIN — FENTANYL CITRATE 50 MCG: 50 INJECTION, SOLUTION INTRAMUSCULAR; INTRAVENOUS at 05:11

## 2018-11-29 RX ADMIN — MORPHINE SULFATE 2 MG: 4 INJECTION INTRAVENOUS at 06:11

## 2018-11-29 RX ADMIN — SUCCINYLCHOLINE CHLORIDE 100 MG: 20 INJECTION, SOLUTION INTRAMUSCULAR; INTRAVENOUS at 03:11

## 2018-11-29 RX ADMIN — MIDAZOLAM 2 MG: 1 INJECTION INTRAMUSCULAR; INTRAVENOUS at 03:11

## 2018-11-29 RX ADMIN — MORPHINE SULFATE 4 MG: 4 INJECTION, SOLUTION INTRAMUSCULAR; INTRAVENOUS at 08:11

## 2018-11-29 RX ADMIN — LIDOCAINE HYDROCHLORIDE 50 MG: 10 INJECTION, SOLUTION INFILTRATION; PERINEURAL at 03:11

## 2018-11-29 RX ADMIN — ONDANSETRON 4 MG: 2 INJECTION, SOLUTION INTRAMUSCULAR; INTRAVENOUS at 04:11

## 2018-11-29 RX ADMIN — MORPHINE SULFATE 4 MG: 4 INJECTION, SOLUTION INTRAMUSCULAR; INTRAVENOUS at 03:11

## 2018-11-29 RX ADMIN — PROPOFOL 200 MG: 10 INJECTION, EMULSION INTRAVENOUS at 03:11

## 2018-11-29 RX ADMIN — VANCOMYCIN HYDROCHLORIDE 1250 MG: 10 INJECTION, POWDER, LYOPHILIZED, FOR SOLUTION INTRAVENOUS at 01:11

## 2018-11-29 RX ADMIN — DEXAMETHASONE SODIUM PHOSPHATE 8 MG: 4 INJECTION, SOLUTION INTRA-ARTICULAR; INTRALESIONAL; INTRAMUSCULAR; INTRAVENOUS; SOFT TISSUE at 04:11

## 2018-11-29 RX ADMIN — FENTANYL CITRATE 100 MCG: 50 INJECTION, SOLUTION INTRAMUSCULAR; INTRAVENOUS at 03:11

## 2018-11-29 RX ADMIN — MORPHINE SULFATE 4 MG: 4 INJECTION, SOLUTION INTRAMUSCULAR; INTRAVENOUS at 10:11

## 2018-11-29 RX ADMIN — OXYCODONE HYDROCHLORIDE 5 MG: 5 TABLET ORAL at 08:11

## 2018-11-29 RX ADMIN — ROCURONIUM BROMIDE 5 MG: 10 INJECTION, SOLUTION INTRAVENOUS at 03:11

## 2018-11-29 RX ADMIN — VANCOMYCIN HYDROCHLORIDE 1250 MG: 10 INJECTION, POWDER, LYOPHILIZED, FOR SOLUTION INTRAVENOUS at 12:11

## 2018-11-29 NOTE — PLAN OF CARE
Problem: Patient Care Overview  Goal: Plan of Care Review  Outcome: Ongoing (interventions implemented as appropriate)  Patient AAOX3. No c/o pain at this time. Patient NSR during shift on Tele monitor. Patient's vitals wnl during shift. Family present at bedside. Will continue to monitor patient. Patient in surgery currently.

## 2018-11-29 NOTE — PROGRESS NOTES
Orthopedic Sports Surgery Rounding Note    2018    Patient seen and examined. Dressing C/D/I. Wound Care nurse changed dressing this AM. Patient complains of pain while at bedside but able to active flex and extend elbow and fingers without pain.     Vital Signs:    Vitals:    18 1120 18 1135 18 1320 18 1400   BP: 120/71      BP Location:       Patient Position:       Pulse: 67 65 70 84   Resp: 18   18   Temp: 98.3 °F (36.8 °C)      TempSrc:       SpO2: 97%      Weight:       Height:           Temp (48hrs), Av.1 °F (36.7 °C), Min:97.4 °F (36.3 °C), Max:99.4 °F (37.4 °C)      Recent Lab Results:  Recent Labs   Lab 18  1248 18  0534 18  0539   * 135* 138   K 3.8 4.3 3.7    103 106   CO2 21* 25 25   BUN 14 10 12   CREATININE 1.0 0.8 0.8   * 169* 120*   CALCIUM 9.9 8.9 8.7     Recent Labs     18  1248 18  0534   WBC 25.09* 24.03*   HGB 12.5* 11.3*   HCT 35.8* 32.6*    214         Physical Exam:  A/O *3. No acute distress    Right Upper Extremity  Dressing/Incision C/D/I  Sensation Present  Cap refill less than 2 sec  Patient mildly tender to palpation in forearm without erythema present  Compartment soft, Tender to palpation  Finger flexion/extension present    Assessment:  Right Cellulitis of Hand/Forearm with Sepsis s/pI&D, Volar and Dorsal Fasciotomies of Forearm/Hand   , POD # 2    Plan:  Pain Controlled  PT/OT  DVT prophylaxis  Right UE NWB  Wound Care BID  Antibiotics per Hospital team   Repeat I&D planned for 18  Possible wound closure.    Jose Montague MD  Orthopedic Surgery Sports Medicine

## 2018-11-29 NOTE — CONSULTS
"Consulted on this 27 y/o M patient for post op wound care s/p RUE I&D and fasciiotomy on 11/27. Per MD notes, patient is to return to OR today for washout and potential closure.  Patient is resting quietly in room, awakens to voice.  Premedicated for pain by primary nurse.  Wound care consult stated : BID dressing changes with adaptic, gauze, abd pads, kerlix, ace wrap, however, no nursing orders placed, so this is the first dressing change.  Surgical dressing removed using saline to loosen adhered dressings.  Patient has incision to anterior forearm measuring 12x3x0.3cm with moist red healthy muscle noted to bed, edges secured with staples and red vessel loops.  Suzie wound skin intact.  Posterior forearm incision measures 14x4x0.3cm with moist red healthy muscle noted to bed, edges secured with staples and red vessel loops.  Suzie wound skin intact.  Dorsal hand fasciiotomy incisions x2 noted, each measures 3.5x1x0.6cm with moist red and yellow wound bed, no slough. Suzie wound skin intact.  Medial hand incision measures 4x0.1x0.1cm; lateral hand incision measures 4x0.2x0.1cm.  Right lateral thumb incision measures 3x0.3x0.2cm.  Right thumb abscess site wound measures 4x4x0.2cm. Right thumb remains edematous and erythematous.All wounds with moderate amount serosanguinous drainage noted.  All wounds cleansed with saline and surrounding skin patted dry.  Adaptic applied to cover all wound beds, topped with dry 4x4 gauze and ABD pads, then wrapped with kerlix and secured with ACE.  Patient tolerated with severe pain during procedure. Coached with relaxing breathing and breaks take. Patient states "if they have to do this again, they will have to knock me out."  Encouraged patient that he is to return to surgery this afternoon for potential closure.    Right anterior forearm        Right Posterior Forearm        Right Dorsal Hand x2        Right lateral Thumb        Right Medial Hand        Right Thumb        "

## 2018-11-29 NOTE — ANESTHESIA PREPROCEDURE EVALUATION
11/29/2018  Margarito Gale is a 28 y.o., male.    Anesthesia Evaluation    I have reviewed the Patient Summary Reports.    I have reviewed the Nursing Notes.   I have reviewed the Medications.     Review of Systems  Anesthesia Hx:  No problems with previous Anesthesia  Denies Family Hx of Anesthesia complications.   Denies Personal Hx of Anesthesia complications.   Social:  Smoker    Hematology/Oncology:  Hematology Normal   Oncology Normal     EENT/Dental:EENT/Dental Normal   Cardiovascular:   Exercise tolerance: good    Pulmonary:  Pulmonary Normal    Renal/:  Renal/ Normal     Hepatic/GI:  Hepatic/GI Normal    Musculoskeletal:  Musculoskeletal Normal    Neurological:  Neurology Normal    Endocrine:  Endocrine Normal    Dermatological:  Skin Normal    Psych:   Psychiatric History          Physical Exam  General:  Well nourished    Airway/Jaw/Neck:  Airway Findings: Mouth Opening: Normal Tongue: Normal  General Airway Assessment: Adult  Mallampati: II  TM Distance: Normal, at least 6 cm      Dental:  Dental Findings: In tact   Chest/Lungs:  Chest/Lungs Findings: Clear to auscultation, Normal Respiratory Rate     Heart/Vascular:  Heart Findings: Rate: Normal  Rhythm: Regular Rhythm  Sounds: Normal        Mental Status:  Mental Status Findings:  Cooperative, Alert and Oriented         Anesthesia Plan  Type of Anesthesia, risks & benefits discussed:  Anesthesia Type:  general  Patient's Preference:   Intra-op Monitoring Plan:   Intra-op Monitoring Plan Comments:   Post Op Pain Control Plan: multimodal analgesia  Post Op Pain Control Plan Comments:   Induction:   IV  Beta Blocker:  Patient is not currently on a Beta-Blocker (No further documentation required).       Informed Consent: Patient understands risks and agrees with Anesthesia plan.  Questions answered. Anesthesia consent signed with  patient.  ASA Score: 2     Day of Surgery Review of History & Physical: I have interviewed and examined the patient. I have reviewed the patient's H&P dated:  There are no significant changes.          Ready For Surgery From Anesthesia Perspective.

## 2018-11-29 NOTE — CONSULTS
Pharmacy Clinical Update:  Margarito Gale   MRN: [id number]  RM: [id number]    Vancomycin trough level = 9.7 mcg/mL @ 2358 , 11/28 prior to the 4th dose of Vancomycin 1 g every 12 hours.   Goal trough = 15-20 d/t sepsis w/cellulitis     Pharmacy will increase the patient's dose to Vancomycin 1,250 mg every 12 hours and collect another trough level on 11/30 @ 12:30 to determine whether he is at a therapeutic range of 15-20 mcg/mL    SCr = 0.8  Est CrCl = 122 mL/min  WBC: 24.03 K/uL  Rui Clancy, MalaD

## 2018-11-29 NOTE — PLAN OF CARE
Problem: Patient Care Overview  Goal: Plan of Care Review  Outcome: Ongoing (interventions implemented as appropriate)  The patient has been sinus rhythm/sinus tach on the monitor. PT NPO anticipating surgery today. Pain managed with prn medication. Pt is watching TV, will continue to monitor.

## 2018-11-29 NOTE — PLAN OF CARE
"Problem: Patient Care Overview  Goal: Individualization & Mutuality  Outcome: Ongoing (interventions implemented as appropriate)  Pt AAO x4.  VSS.  Pt remained afebrile throughout this shift.   IV abx administered per order.   Pt remained free of falls this shift.   Pt c/o rt hand pain this shift.Pain meds administered as ordered.   Plan of care reviewed. Patient verbalizes understanding.   Pt moving/turning independently. Ambulated in the room without diff.  Patient NS on monitor.   Bed low, side rails up x 2, wheels locked, call light in reach.   Patient instructed to call for assistance.   Hourly rounding completed.   Will continue to monitor.    Blood pressure 109/65, pulse 87, temperature 97.4 °F (36.3 °C), temperature source Oral, resp. rate 16, height 5' 7" (1.702 m), weight 63.3 kg (139 lb 8.8 oz), SpO2 100 %.  .        "

## 2018-11-30 LAB
ANION GAP SERPL CALC-SCNC: 9 MMOL/L
BASOPHILS # BLD AUTO: 0 K/UL
BASOPHILS NFR BLD: 0 %
BUN SERPL-MCNC: 9 MG/DL
CALCIUM SERPL-MCNC: 9.4 MG/DL
CHLORIDE SERPL-SCNC: 104 MMOL/L
CO2 SERPL-SCNC: 26 MMOL/L
CREAT SERPL-MCNC: 0.8 MG/DL
DIFFERENTIAL METHOD: ABNORMAL
EOSINOPHIL # BLD AUTO: 0 K/UL
EOSINOPHIL NFR BLD: 0 %
ERYTHROCYTE [DISTWIDTH] IN BLOOD BY AUTOMATED COUNT: 12.3 %
EST. GFR  (AFRICAN AMERICAN): >60 ML/MIN/1.73 M^2
EST. GFR  (NON AFRICAN AMERICAN): >60 ML/MIN/1.73 M^2
GLUCOSE SERPL-MCNC: 164 MG/DL
HCT VFR BLD AUTO: 32.3 %
HGB BLD-MCNC: 11.3 G/DL
LYMPHOCYTES # BLD AUTO: 0.8 K/UL
LYMPHOCYTES NFR BLD: 5.5 %
MAGNESIUM SERPL-MCNC: 1.5 MG/DL
MCH RBC QN AUTO: 30.6 PG
MCHC RBC AUTO-ENTMCNC: 35 G/DL
MCV RBC AUTO: 88 FL
MONOCYTES # BLD AUTO: 0.5 K/UL
MONOCYTES NFR BLD: 3.7 %
NEUTROPHILS # BLD AUTO: 12.4 K/UL
NEUTROPHILS NFR BLD: 90.8 %
PHOSPHATE SERPL-MCNC: 3.9 MG/DL
PLATELET # BLD AUTO: 292 K/UL
PMV BLD AUTO: 9.6 FL
POTASSIUM SERPL-SCNC: 4.2 MMOL/L
RBC # BLD AUTO: 3.69 M/UL
SODIUM SERPL-SCNC: 139 MMOL/L
VANCOMYCIN TROUGH SERPL-MCNC: 7.2 UG/ML
WBC # BLD AUTO: 13.68 K/UL

## 2018-11-30 PROCEDURE — 99232 SBSQ HOSP IP/OBS MODERATE 35: CPT | Mod: ,,, | Performed by: SURGERY

## 2018-11-30 PROCEDURE — 63600175 PHARM REV CODE 636 W HCPCS: Performed by: INTERNAL MEDICINE

## 2018-11-30 PROCEDURE — 25000003 PHARM REV CODE 250: Performed by: NURSE PRACTITIONER

## 2018-11-30 PROCEDURE — 63600175 PHARM REV CODE 636 W HCPCS: Performed by: EMERGENCY MEDICINE

## 2018-11-30 PROCEDURE — 94760 N-INVAS EAR/PLS OXIMETRY 1: CPT

## 2018-11-30 PROCEDURE — 83735 ASSAY OF MAGNESIUM: CPT

## 2018-11-30 PROCEDURE — 80202 ASSAY OF VANCOMYCIN: CPT

## 2018-11-30 PROCEDURE — 21400001 HC TELEMETRY ROOM

## 2018-11-30 PROCEDURE — 80048 BASIC METABOLIC PNL TOTAL CA: CPT

## 2018-11-30 PROCEDURE — 25000003 PHARM REV CODE 250: Performed by: INTERNAL MEDICINE

## 2018-11-30 PROCEDURE — 94761 N-INVAS EAR/PLS OXIMETRY MLT: CPT

## 2018-11-30 PROCEDURE — 84100 ASSAY OF PHOSPHORUS: CPT

## 2018-11-30 PROCEDURE — 85025 COMPLETE CBC W/AUTO DIFF WBC: CPT

## 2018-11-30 PROCEDURE — 36415 COLL VENOUS BLD VENIPUNCTURE: CPT

## 2018-11-30 RX ORDER — ALPRAZOLAM 1 MG/1
1 TABLET ORAL 3 TIMES DAILY PRN
Status: DISCONTINUED | OUTPATIENT
Start: 2018-11-30 | End: 2018-12-01 | Stop reason: HOSPADM

## 2018-11-30 RX ORDER — MORPHINE SULFATE 4 MG/ML
4 INJECTION, SOLUTION INTRAMUSCULAR; INTRAVENOUS EVERY 4 HOURS PRN
Status: DISCONTINUED | OUTPATIENT
Start: 2018-11-30 | End: 2018-12-01 | Stop reason: HOSPADM

## 2018-11-30 RX ORDER — MORPHINE SULFATE 2 MG/ML
2 INJECTION, SOLUTION INTRAMUSCULAR; INTRAVENOUS EVERY 4 HOURS PRN
Status: DISCONTINUED | OUTPATIENT
Start: 2018-11-30 | End: 2018-12-01 | Stop reason: HOSPADM

## 2018-11-30 RX ORDER — OXYCODONE HYDROCHLORIDE 5 MG/1
5 TABLET ORAL EVERY 4 HOURS PRN
Status: DISCONTINUED | OUTPATIENT
Start: 2018-11-30 | End: 2018-12-01 | Stop reason: HOSPADM

## 2018-11-30 RX ADMIN — MORPHINE SULFATE 4 MG: 4 INJECTION, SOLUTION INTRAMUSCULAR; INTRAVENOUS at 11:11

## 2018-11-30 RX ADMIN — VANCOMYCIN HYDROCHLORIDE 1250 MG: 10 INJECTION, POWDER, LYOPHILIZED, FOR SOLUTION INTRAVENOUS at 05:11

## 2018-11-30 RX ADMIN — ALPRAZOLAM 1 MG: 1 TABLET ORAL at 08:11

## 2018-11-30 RX ADMIN — OXYCODONE HYDROCHLORIDE 5 MG: 5 TABLET ORAL at 01:11

## 2018-11-30 RX ADMIN — VANCOMYCIN HYDROCHLORIDE 1250 MG: 10 INJECTION, POWDER, LYOPHILIZED, FOR SOLUTION INTRAVENOUS at 01:11

## 2018-11-30 RX ADMIN — OXYCODONE HYDROCHLORIDE 5 MG: 5 TABLET ORAL at 08:11

## 2018-11-30 RX ADMIN — MORPHINE SULFATE 4 MG: 4 INJECTION, SOLUTION INTRAMUSCULAR; INTRAVENOUS at 05:11

## 2018-11-30 NOTE — PROGRESS NOTES
[facility] Hospital Medicine  Progress Note    Patient Name: Margarito Gale  MRN: [id number]  Patient Class: IP- Inpatient   Admission Date: 11/27/2018  Length of Stay: 1 days  Attending Physician: Pan Lorenzo MD  Primary Care Provider: FREDDIE Butcher        Subjective:     Principal Problem:Cellulitis of hand    HPI:  Margarito Gale is a 28 year old male who presented to the ED with complaints of right hand swelling and pain. The patient reports that he was cutting a ziptie while working on his bike outside and cut his hand. He denies that the knife was dirty and has not tried any treatments. He further denies other symptoms including fever. Review of the patient's chart shows that he has a history of recurrent infections including a gluteal abscess in July of 2018 and left elbow cellulitis in May of 2018. In the ED, the patient's WBC count was 25,090 and his blood glucose was mildly elevated to 151.     Hospital Course:  Admitted for evaluation and treatment of cellulitis of the right hand and forearm.  Empirically treated with Vancomycin.  27 November taken to the OR by orthopedic surgery for I&D and fasciotomy.  Return to OR for closure and wound VAC placement 29 November.  Cultures grew S.pyogenes and S. Aureus.  Sensitivities pending.  Continuing Vancomycin.    No new subjective & objective note has been filed under this hospital service since the last note was generated.    Assessment/Plan:      * Cellulitis of hand with sepsis    IV Vancomycin.  CT hand shows soft tissue swelling without gas or abscess.  Evaluated by Orthopedic Surgery and taken to the OR 27 November for I&D and fasciotomy.  Wound and blood cultures pending.     Tobacco abuse    -Patient counseled on cessation.   -Declines nicotine patch.        Hyperglycemia    -Patient with nonfasting hyperglycemia on all measurements in his chart and recurrent infections.  -Check fasting glucose and hemoglobin  A1C to further evaluate.             VTE Risk Mitigation (From admission, onward)        Ordered     Place sequential compression device  Until discontinued      11/27/18 1525     IP VTE LOW RISK PATIENT  Once      11/27/18 1525              Pan Lorenzo MD  Department of Hospital Medicine   [facility]

## 2018-11-30 NOTE — OP NOTE
Operative Note        Surgery Date: 11/29/2018      Surgeon(s) and Role:     * Jose Montague MD - Primary     ASSISTANT: Gaby BISWAS     The use of Gaby BISWAS  was medically necessary for patient positioning, skin retraction, closure and assistance with this procedure. The procedure could not be performed properly without the use of her as an assistant. There was no qualified resident/fellow available for assistance with this procedure.         Pre-op Diagnosis:    1. Right arm cellulitis  2. Right thumb abscess  3. Sepsis  4. Compartment syndrome status post release     Post-op Diagnosis: Same     Procedures: Incision and drainage right thumb abscess  2. Complex wound closure   3. Application of wound vac dorsal forearm wound     Anesthesia: General     Estimated Blood Loss: 20ml     Specimens:cultures     OPERATIVE INDICATIONS: Margarito Gale is a 28 y.o. male   who is admitted for sepsis secondary to right arm cellulitis.  Was started on IV antibiotics and had worsening swelling and pain in his hand and forearm.   Had a CT scan demonstrating extensive cellulitis and subcutaneous edema.   Underwent incision and drainage and fasciotomies for compartment syndrome 2 days ago.  Was indicated for repeat wound exploration, debridement, possible wound closure.  The patient was given the risks, benefits,indications and alternatives of the procedure with written informed consent, and wished to proceed. It was discussed with him again about the high risk of possible amputation and he expressed understanding.      PROCEDURE IN DETAIL: Patient was met in the preoperative holding area where consents were signed and site was marked. At this time, all questions were answered. The patient was in agreement.  Patient was then taken back to the Operating Room where he was transferred to the hospital bed. All neurovascular structures were well padded.      No tourniquet was placed on the upper  extremity.  The upper extremity prepped and draped in a sterile fashion. Antibiotics given. Timeout was performed at this time. All parties agreed.      Volar forearm wound explored, healthy-appearing tissue. No fluid or abscess collections.    Dorsal forearm wound explored, healthy-appearing tissue, no fluid abscess collections.    Dorsal thumb wound explored, small abscess encountered. Incision made over the dorsal aspect of thumb and abscess drained.      Thenar and hypothenar incisions explored, healthy-appearing tissue, no fluid abscess collections.    Wounds irrigated with 12 L of normal saline. Volar forearm wound closed without tension. Thenar and hypothenar incisions loosely approximated.  Dorsal forearm incision unable to be closed secondary to tension. Vessel loops and staples placed. Dorsal hand wounds over metacarpals loosely approximated.  Packing placed into some wound abscess.     Wound VAC placed over dorsal forearm wound.        Wounds covered with Adaptic, gauze, Kerlix, Ace.      Good palpable pulses and good cap refill.     The case ended with no complications. Patient was transferred from the surgery bed to the [facility] and was taken back to the PACU.  Patient recovered from anesthesia.     The postoperative plan will be for IV antibiotics, follow up cultures. Will need wound care team with dressing changes.  Will need long-term wound care, or plastics coverage for wounds. May require return to OR if clinic picture worsens.

## 2018-11-30 NOTE — SUBJECTIVE & OBJECTIVE
Interval History: patient gets very upset easily.     Review of Systems   Constitutional: Negative for activity change, chills and fever.   Respiratory: Negative for shortness of breath.    Cardiovascular: Negative for chest pain.   Gastrointestinal: Negative for abdominal pain, nausea and vomiting.   Genitourinary: Negative for dysuria.   Musculoskeletal: Positive for arthralgias and joint swelling. Negative for myalgias.        Right hand, wrist, and forearm   Skin: Positive for wound.   Neurological: Negative for weakness.   Hematological: Does not bruise/bleed easily.   Psychiatric/Behavioral: Positive for agitation. The patient is not nervous/anxious.      Objective:     Vital Signs (Most Recent):  Temp: (pt refused vitals) (11/30/18 1100)  Pulse: 76 (11/30/18 0938)  Resp: (!) 76 (11/30/18 0938)  BP: 130/80 (11/30/18 0938)  SpO2: 100 % (11/30/18 0938) Vital Signs (24h Range):  Temp:  [97.3 °F (36.3 °C)-98.7 °F (37.1 °C)] 97.8 °F (36.6 °C)  Pulse:  [] 76  Resp:  [14-76] 76  SpO2:  [96 %-100 %] 100 %  BP: (118-133)/(74-86) 130/80     Weight: 63.3 kg (139 lb 8.8 oz)  Body mass index is 21.86 kg/m².    Intake/Output Summary (Last 24 hours) at 11/30/2018 1623  Last data filed at 11/30/2018 0600  Gross per 24 hour   Intake 1400 ml   Output --   Net 1400 ml      Physical Exam   Constitutional: He is oriented to person, place, and time. He appears well-developed and well-nourished. He is active. No distress.   HENT:   Head: Normocephalic and atraumatic.   Mouth/Throat: Oropharynx is clear and moist.   Eyes: Conjunctivae and EOM are normal. Pupils are equal, round, and reactive to light.   Neck: No JVD present. No thyromegaly present.   Cardiovascular: Normal rate, regular rhythm and normal heart sounds. Exam reveals no gallop and no friction rub.   No murmur heard.  Pulmonary/Chest: Effort normal and breath sounds normal. No respiratory distress. He has no wheezes. He has no rales.   Abdominal: Soft. Bowel sounds  are normal. He exhibits no distension. There is no tenderness. There is no rebound and no guarding.   Musculoskeletal: Normal range of motion. He exhibits no edema or tenderness.   Lymphadenopathy:     He has no cervical adenopathy.   Neurological: He is alert and oriented to person, place, and time. He has normal reflexes. He displays normal reflexes. No cranial nerve deficit.   Skin: Skin is warm and dry. Capillary refill takes less than 2 seconds. No rash noted. He is not diaphoretic. No erythema. No pallor.   Right hand dressing intact with wound vac   Psychiatric: He has a normal mood and affect. His behavior is normal. Judgment and thought content normal.     Significant Labs:   CBC:   Recent Labs   Lab 11/29/18  0539 11/30/18  0536   WBC 20.61* 13.68*   HGB 10.2* 11.3*   HCT 29.9* 32.3*    292     CMP:   Recent Labs   Lab 11/29/18  0539 11/30/18  0536    139   K 3.7 4.2    104   CO2 25 26   * 164*   BUN 12 9   CREATININE 0.8 0.8   CALCIUM 8.7 9.4   ANIONGAP 7* 9   EGFRNONAA >60 >60       Significant Imaging:   Imaging Results          CT Hand With Contrast Right (Final result)  Result time 11/27/18 15:19:05    Final result by JANIS Patterson Sr., MD (11/27/18 15:19:05)                 Impression:      1. There is a prominent amount of edema in the soft tissue of the thumb and in the dorsum of the hand.  This is consistent with the patient's history and characteristic of cellulitis.  There is no abscess visualized.  2. There is no evidence of osteomyelitis.  All CT scans at this facility use dose modulation, iterative reconstruction, and/or weight base dosing when appropriate to reduce radiation dose when appropriate to reduce radiation dose to as low as reasonably achievable.      Electronically signed by: Yuan Patterson MD  Date:    11/27/2018  Time:    15:19             Narrative:    EXAMINATION:  CT HAND WITH CONTRAST RIGHT    CLINICAL HISTORY:  Hand erythema, swelling,  cellulitis suspected;    TECHNIQUE:  Standard hand CT protocol with IV contrast material was performed.  75 mL of Omnipaque 350 contrast material was used for this examination.    COMPARISON:  None    FINDINGS:  There is a prominent amount of edema in the soft tissue of the thumb and in the dorsum of the hand.  There is no abscess visualized.  There is no fracture or dislocation.  There is no evidence of osteomyelitis.                               X-Ray Hand 3 view Right (Final result)  Result time 11/27/18 12:54:54    Final result by JANIS Patterson Sr., MD (11/27/18 12:54:54)                 Impression:      There is moderate prominence of the soft tissue thickness in the dorsum of the right hand.  This is characteristic of a cellulitis or soft tissue contusion.      Electronically signed by: Yuan Patterson MD  Date:    11/27/2018  Time:    12:54             Narrative:    EXAMINATION:  XR HAND COMPLETE 3 VIEW RIGHT    CLINICAL HISTORY:  hand pain;    COMPARISON:  None    FINDINGS:  There is no fracture. There is no dislocation.  There is moderate prominence of the soft tissue thickness in the dorsum of the right hand.                               X-Ray Chest AP Portable (Final result)  Result time 11/27/18 12:55:09    Final result by Rui Arteaga MD (11/27/18 12:55:09)                 Impression:      Normal single view of the chest.      Electronically signed by: Rui Arteaga MD  Date:    11/27/2018  Time:    12:55             Narrative:    EXAMINATION:  XR CHEST AP PORTABLE    CLINICAL HISTORY:  Sepsis;    TECHNIQUE:  Single frontal view of the chest was performed.    COMPARISON:  None    FINDINGS:  The lungs are clear, with normal appearance of pulmonary vasculature.  No pleural effusion or pneumothorax.    The cardiac silhouette is normal in size. The hilar and mediastinal contours are unremarkable.

## 2018-11-30 NOTE — PROGRESS NOTES
Orthopedic Sports Surgery Rounding Note    2018    Patient seen and examined. Dressing C/D/I. Pain much improved. Inquiring about discharge.    Vital Signs:    Vitals:    18 0105 18 0303 18 0405 18 0502   BP:   118/76    BP Location:   Left arm    Patient Position:   Lying    Pulse: 80 60 62 71   Resp:   18    Temp:   98.2 °F (36.8 °C)    TempSrc:   Oral    SpO2:   99%    Weight:       Height:           Temp (48hrs), Av °F (36.7 °C), Min:97.3 °F (36.3 °C), Max:98.9 °F (37.2 °C)      Recent Lab Results:  Recent Labs   Lab 18  0534 18  0539 18  0536   * 138 139   K 4.3 3.7 4.2    106 104   CO2 25 25 26   BUN 10 12 9   CREATININE 0.8 0.8 0.8   * 120* 164*   CALCIUM 8.9 8.7 9.4     Recent Labs     18  0534 18  0539 18  0536   WBC 24.03* 20.61* 13.68*   HGB 11.3* 10.2* 11.3*   HCT 32.6* 29.9* 32.3*    255 292         Physical Exam:  A/O *3. No acute distress    Right Upper Extremity  Dressing/Incision C/D/I  Sensation Present  Cap refill less than 2 sec  Compartment soft, Tender to palpation  Finger flexion/extension present    Assessment:  Right Cellulitis of Hand/Forearm with Sepsis s/p repeat I&D and wound closure, VAC, POD # 1    Plan:  Pain Control  PT/OT  DVT prophylaxis  Right UE NWB  Wound Care BID  Antibiotics per Hospital team   Will need wound care team for VAC changes and definitive wound care or plastics for coverage      Jose Montague MD  Orthopedic Surgery Sports Medicine

## 2018-11-30 NOTE — ASSESSMENT & PLAN NOTE
IV Vancomycin.  CT hand shows soft tissue swelling without gas or abscess.  Evaluated by Orthopedic Surgery and taken to the OR 27 November for I&D and fasciotomy.  Wound and blood cultures pending.  11/30/18 final cultures grew strep and MSSA. See plan above

## 2018-11-30 NOTE — NURSING
"Attempted to give Hydrocodone to patient he refused. He stated "Thats not strong enough for me". Explained it would be a minute or so before I can get someone else to assist in pushing the Morphine he started cursing. Patient very upset and agitated .   "

## 2018-11-30 NOTE — PROGRESS NOTES
Again attempted to complete post op consult for wound care.  Patient is again agitated and cursing in room, and security called to room prior to my getting there. Will await patient calming.

## 2018-11-30 NOTE — CONSULTS
Attempted to complete post op consult for wound care. Patient currently agitated in room and awaiting pain medication. Will revisit later once patient has calmed.

## 2018-11-30 NOTE — PROGRESS NOTES
[facility] Hospital Medicine  Progress Note    Patient Name: Margarito Gale  MRN: [id number]  Patient Class: IP- Inpatient   Admission Date: 11/27/2018  Length of Stay: 2 days  Attending Physician: Pan Lorenzo MD  Primary Care Provider: FREDDIE Butcher    Subjective:     Principal Problem:Cellulitis of hand    HPI:  Margarito Gale is a 28 year old male who presented to the ED with complaints of right hand swelling and pain. The patient reports that he was cutting a ziptie while working on his bike outside and cut his hand. He denies that the knife was dirty and has not tried any treatments. He further denies other symptoms including fever. Review of the patient's chart shows that he has a history of recurrent infections including a gluteal abscess in July of 2018 and left elbow cellulitis in May of 2018. In the ED, the patient's WBC count was 25,090 and his blood glucose was mildly elevated to 151.     Hospital Course:  Admitted for evaluation and treatment of cellulitis of the right hand and forearm.  Empirically treated with Vancomycin.  27 November taken to the OR by orthopedic surgery for I&D and fasciotomy.  Return to OR for closure and wound VAC placement 29 November.  Cultures grew S.pyogenes and S. Aureus.  Sensitivities pending.  Continuing Vancomycin.     11/30/18 POD #1 will need extended care with wound vac due to complex nature of wound closure.   Seen by General surgery who recommends outpatient follow up for skin graft. Will need to arrange dressing change appointments with nancy nick after wound vac has been approved. Nonweightbearing to right UE.         Interval History: patient gets very upset easily.     Review of Systems   Constitutional: Negative for activity change, chills and fever.   Respiratory: Negative for shortness of breath.    Cardiovascular: Negative for chest pain.   Gastrointestinal: Negative for abdominal pain, nausea and vomiting.    Genitourinary: Negative for dysuria.   Musculoskeletal: Positive for arthralgias and joint swelling. Negative for myalgias.        Right hand, wrist, and forearm   Skin: Positive for wound.   Neurological: Negative for weakness.   Hematological: Does not bruise/bleed easily.   Psychiatric/Behavioral: Positive for agitation. The patient is not nervous/anxious.      Objective:     Vital Signs (Most Recent):  Temp: (pt refused vitals) (11/30/18 1100)  Pulse: 76 (11/30/18 0938)  Resp: (!) 76 (11/30/18 0938)  BP: 130/80 (11/30/18 0938)  SpO2: 100 % (11/30/18 0938) Vital Signs (24h Range):  Temp:  [97.3 °F (36.3 °C)-98.7 °F (37.1 °C)] 97.8 °F (36.6 °C)  Pulse:  [] 76  Resp:  [14-76] 76  SpO2:  [96 %-100 %] 100 %  BP: (118-133)/(74-86) 130/80     Weight: 63.3 kg (139 lb 8.8 oz)  Body mass index is 21.86 kg/m².    Intake/Output Summary (Last 24 hours) at 11/30/2018 1623  Last data filed at 11/30/2018 0600  Gross per 24 hour   Intake 1400 ml   Output --   Net 1400 ml      Physical Exam   Constitutional: He is oriented to person, place, and time. He appears well-developed and well-nourished. He is active. No distress.   HENT:   Head: Normocephalic and atraumatic.   Mouth/Throat: Oropharynx is clear and moist.   Eyes: Conjunctivae and EOM are normal. Pupils are equal, round, and reactive to light.   Neck: No JVD present. No thyromegaly present.   Cardiovascular: Normal rate, regular rhythm and normal heart sounds. Exam reveals no gallop and no friction rub.   No murmur heard.  Pulmonary/Chest: Effort normal and breath sounds normal. No respiratory distress. He has no wheezes. He has no rales.   Abdominal: Soft. Bowel sounds are normal. He exhibits no distension. There is no tenderness. There is no rebound and no guarding.   Musculoskeletal: Normal range of motion. He exhibits no edema or tenderness.   Lymphadenopathy:     He has no cervical adenopathy.   Neurological: He is alert and oriented to person, place, and  time. He has normal reflexes. He displays normal reflexes. No cranial nerve deficit.   Skin: Skin is warm and dry. Capillary refill takes less than 2 seconds. No rash noted. He is not diaphoretic. No erythema. No pallor.   Right hand dressing intact with wound vac   Psychiatric: He has a normal mood and affect. His behavior is normal. Judgment and thought content normal.     Significant Labs:   CBC:   Recent Labs   Lab 11/29/18  0539 11/30/18  0536   WBC 20.61* 13.68*   HGB 10.2* 11.3*   HCT 29.9* 32.3*    292     CMP:   Recent Labs   Lab 11/29/18 0539 11/30/18  0536    139   K 3.7 4.2    104   CO2 25 26   * 164*   BUN 12 9   CREATININE 0.8 0.8   CALCIUM 8.7 9.4   ANIONGAP 7* 9   EGFRNONAA >60 >60       Significant Imaging:   Imaging Results          CT Hand With Contrast Right (Final result)  Result time 11/27/18 15:19:05    Final result by JANIS Patterson Sr., MD (11/27/18 15:19:05)                 Impression:      1. There is a prominent amount of edema in the soft tissue of the thumb and in the dorsum of the hand.  This is consistent with the patient's history and characteristic of cellulitis.  There is no abscess visualized.  2. There is no evidence of osteomyelitis.  All CT scans at this facility use dose modulation, iterative reconstruction, and/or weight base dosing when appropriate to reduce radiation dose when appropriate to reduce radiation dose to as low as reasonably achievable.      Electronically signed by: Yuan Patterson MD  Date:    11/27/2018  Time:    15:19             Narrative:    EXAMINATION:  CT HAND WITH CONTRAST RIGHT    CLINICAL HISTORY:  Hand erythema, swelling, cellulitis suspected;    TECHNIQUE:  Standard hand CT protocol with IV contrast material was performed.  75 mL of Omnipaque 350 contrast material was used for this examination.    COMPARISON:  None    FINDINGS:  There is a prominent amount of edema in the soft tissue of the thumb and in the dorsum of  the hand.  There is no abscess visualized.  There is no fracture or dislocation.  There is no evidence of osteomyelitis.                               X-Ray Hand 3 view Right (Final result)  Result time 11/27/18 12:54:54    Final result by JANIS Patterson Sr., MD (11/27/18 12:54:54)                 Impression:      There is moderate prominence of the soft tissue thickness in the dorsum of the right hand.  This is characteristic of a cellulitis or soft tissue contusion.      Electronically signed by: Yuan Patterson MD  Date:    11/27/2018  Time:    12:54             Narrative:    EXAMINATION:  XR HAND COMPLETE 3 VIEW RIGHT    CLINICAL HISTORY:  hand pain;    COMPARISON:  None    FINDINGS:  There is no fracture. There is no dislocation.  There is moderate prominence of the soft tissue thickness in the dorsum of the right hand.                               X-Ray Chest AP Portable (Final result)  Result time 11/27/18 12:55:09    Final result by Rui Arteaga MD (11/27/18 12:55:09)                 Impression:      Normal single view of the chest.      Electronically signed by: Rui Arteaga MD  Date:    11/27/2018  Time:    12:55             Narrative:    EXAMINATION:  XR CHEST AP PORTABLE    CLINICAL HISTORY:  Sepsis;    TECHNIQUE:  Single frontal view of the chest was performed.    COMPARISON:  None    FINDINGS:  The lungs are clear, with normal appearance of pulmonary vasculature.  No pleural effusion or pneumothorax.    The cardiac silhouette is normal in size. The hilar and mediastinal contours are unremarkable.                                  Assessment/Plan:      * Cellulitis of hand with sepsis    IV Vancomycin.  CT hand shows soft tissue swelling without gas or abscess.  Evaluated by Orthopedic Surgery and taken to the OR 27 November for I&D and fasciotomy.  Wound and blood cultures pending.  11/30/18 final cultures grew strep and MSSA. See plan above     H/O fasciotomy    Due to compartment syndrome. Has  wound vac in place. Awaiting approval for outpatient wound vac. Will need dressing changes TID once wound vac has been approved. Will need to arrange at [facility].        Tobacco abuse    -Patient counseled on cessation.   -Declines nicotine patch.        Hyperglycemia    -Patient with nonfasting hyperglycemia on all measurements in his chart and recurrent infections.  -Check fasting glucose and hemoglobin A1C to further evaluate.    -[id number] 5.4         VTE Risk Mitigation (From admission, onward)        Ordered     Place sequential compression device  Until discontinued      11/27/18 1525     IP VTE LOW RISK PATIENT  Once      11/27/18 1525            Ramses Santos NP  Department of Hospital Medicine   [facility]

## 2018-11-30 NOTE — CONSULTS
[facility]  General Surgery  Consult Note    Patient Name: Margarito Gale  MRN: [id number]  Code Status: Full Code  Admission Date: 11/27/2018  Hospital Length of Stay: 2 days  Attending Physician: Pan Lorenzo MD  Primary Care Provider: FREDDIE Butcher    Patient information was obtained from patient, past medical records and ER records.     Inpatient consult to General Surgery  Consult performed by: Magda Ramirez PA-C  Consult ordered by: Ramses Santos NP        Subjective:     Principal Problem: Cellulitis of hand    History of Present Illness: Margarito Gale is a 28 y.o. male who was admitted for evaluation and treatment of cellulitis of the right hand and forearm.  Empirically treated with Vancomycin.  27 November taken to the OR by orthopedic surgery for I&D and fasciotomy.  Return to OR for closure and wound VAC placement 29 November.  Cultures grew S.pyogenes and S. Aureus.  Sensitivities pending. General Surgery was consulted for definitive closure, possible skin grafting in the future.         No current facility-administered medications on file prior to encounter.      No current outpatient medications on file prior to encounter.       Review of patient's allergies indicates:   Allergen Reactions    Pcn [penicillins] Anaphylaxis    Amoxicillin Hives       Past Medical History:   Diagnosis Date    ADD (attention deficit disorder)     Lumbago      Past Surgical History:   Procedure Laterality Date    APPLICATION OF WOUND VACUUM-ASSISTED CLOSURE DEVICE Right 11/29/2018    Procedure: APPLICATION, WOUND VAC;  Surgeon: Jose Montague MD;  Location: [facility] OR;  Service: Orthopedics;  Laterality: Right;    APPLICATION, WOUND VAC Right 11/29/2018    Performed by Jose Montague MD at [facility] OR    CARPAL TUNNEL RELEASE Right 11/27/2018    Procedure: RELEASE, CARPAL TUNNEL;  Surgeon: Jose Montague MD;  Location: [facility] OR;  Service:  Orthopedics;  Laterality: Right;    CLOSURE OF WOUND Right 11/29/2018    Procedure: CLOSURE, WOUND;  Surgeon: Jose Montague MD;  Location: [facility] OR;  Service: Orthopedics;  Laterality: Right;    CLOSURE, WOUND Right 11/29/2018    Performed by Jose Montague MD at [facility] OR    FASCIOTOMY OF HAND Right 11/27/2018    Procedure: FASCIOTOMY, HAND;  Surgeon: Jose Montague MD;  Location: [facility] OR;  Service: Orthopedics;  Laterality: Right;    FASCIOTOMY, HAND Right 11/27/2018    Performed by Jose Montague MD at [facility] OR    INCISION AND DRAINAGE OF HAND Right 11/27/2018    Procedure: INCISION AND DRAINAGE, HAND;  Surgeon: Jose Montague MD;  Location: [facility] OR;  Service: Orthopedics;  Laterality: Right;    INCISION AND DRAINAGE, HAND Right 11/27/2018    Performed by Jose Montague MD at [facility] OR    INCISION AND DRAINAGE, UPPER EXTREMITY Right 11/29/2018    Performed by Jose Montague MD at [facility] OR    RELEASE, CARPAL TUNNEL Right 11/27/2018    Performed by Jose Montague MD at [facility] OR    TONSILLECTOMY       Family History     None        Tobacco Use    Smoking status: Current Every Day Smoker     Packs/day: 0.50     Types: Cigarettes    Smokeless tobacco: Current User     Types: Snuff   Substance and Sexual Activity    Alcohol use: Yes     Comment: states that he drinks when he feels like it; unable to quantify average amount    Drug use: Yes     Types: Marijuana    Sexual activity: Not on file     Review of Systems   Constitutional: Negative for activity change, chills and fever.   Respiratory: Negative for shortness of breath.    Cardiovascular: Negative for chest pain.   Gastrointestinal: Negative for abdominal pain, nausea and vomiting.   Genitourinary: Negative for dysuria.   Musculoskeletal: Positive for arthralgias and joint swelling. Negative for myalgias.        Right hand, wrist, and forearm   Skin: Positive for wound.   Neurological: Negative for  weakness.   Hematological: Does not bruise/bleed easily.   Psychiatric/Behavioral: The patient is not nervous/anxious.      Objective:     Vital Signs (Most Recent):  Temp: 97.8 °F (36.6 °C) (11/30/18 0938)  Pulse: 76 (11/30/18 0938)  Resp: (!) 76 (11/30/18 0938)  BP: 130/80 (11/30/18 0938)  SpO2: 100 % (11/30/18 0938) Vital Signs (24h Range):  Temp:  [97.3 °F (36.3 °C)-98.7 °F (37.1 °C)] 97.8 °F (36.6 °C)  Pulse:  [] 76  Resp:  [14-76] 76  SpO2:  [96 %-100 %] 100 %  BP: (118-133)/(71-86) 130/80     Weight: 63.3 kg (139 lb 8.8 oz)  Body mass index is 21.86 kg/m².    Physical Exam   Constitutional: He is oriented to person, place, and time. He appears well-developed and well-nourished.   HENT:   Head: Normocephalic and atraumatic.   Eyes: EOM are normal.   Cardiovascular: Normal rate and regular rhythm.   Pulmonary/Chest: Effort normal. No respiratory distress.   Abdominal: Soft.   Musculoskeletal: Normal range of motion. He exhibits edema and tenderness.   Right upper extremity with swelling, edema, wound vac in place. Photos in chart reviewed   Neurological: He is alert and oriented to person, place, and time. No sensory deficit.   Skin: Skin is warm and dry.   Multiple tattoos   Psychiatric: He has a normal mood and affect. Thought content normal.   Vitals reviewed.      Significant Labs:  CBC:   Recent Labs   Lab 11/30/18  0536   WBC 13.68*   RBC 3.69*   HGB 11.3*   HCT 32.3*      MCV 88   MCH 30.6   MCHC 35.0     CMP:   Recent Labs   Lab 11/27/18  1248  11/30/18  0536   *   < > 164*   CALCIUM 9.9   < > 9.4   ALBUMIN 4.3  --   --    PROT 7.5  --   --    *   < > 139   K 3.8   < > 4.2   CO2 21*   < > 26      < > 104   BUN 14   < > 9   CREATININE 1.0   < > 0.8   ALKPHOS 86  --   --    ALT 19  --   --    AST 32  --   --    BILITOT 1.1*  --   --     < > = values in this interval not displayed.       Significant Diagnostics:  I have reviewed all pertinent imaging results/findings within  the past 24 hours.    Assessment/Plan:     H/O fasciotomy    General surgery consulted for definitive closure with possible skin grafts in the future.   Discussed with the patient the possible need for skin graft for closure.   Recommend continue current wound care per Ortho and Wound Care nurse.   Pt should follow up in General Surgery clinic with Dr Aguilar in approximately 2-3 weeks.            VTE Risk Mitigation (From admission, onward)        Ordered     Place sequential compression device  Until discontinued      11/27/18 1525     IP VTE LOW RISK PATIENT  Once      11/27/18 1525          Thank you for your consult. I will sign off. Please contact us if you have any additional questions.    Magda Ramirez PA-C  General Surgery  [facility]

## 2018-11-30 NOTE — PLAN OF CARE
"CM met with patient at the bedside in attempt to secure the completion of the wound vac application.  CM asked patient if he was left or right handed.  Patient replied "both".  CM explained that form needed to be completed in order to obtain a home wound vac.  Patient then replied "I can't write with my left hand".  CM began to ask questions needed to complete the indigent form and patient said that the household income (mother's income) was none of his business.  CM informed patient that form requested household income.  Patient became irate and began to curse.  CM informed patient that profanity would not be tolerated.  Patient then told CM "then get out of my room".  CM left form at bedside for patient to complete.  "

## 2018-11-30 NOTE — PROGRESS NOTES
Per CINDY Pope RN , patient is now calm after receiving pain medication and food.  Entered room for wound care. Female friend at bedside.  Discussed needed care with patient and he consents to wound care.  ACE removed. kerlix removed from hand. Gauze with small amount serosanguious drainage noted, wetted with saline and removed per patient.  Adaptic removed per patient.  Dorsal hand incisions x2 noted to be approximated with sutures, clean and intact.  Lateral and medial hand incisions also approximated, clean and intact.  Wound vac noted to dorsal forearm with no leak noted.  Volar forearm gauze and tegaderm left in place per orders.  Packing x2 locations noted to right thumb within larger wound.  Packing removed with severe pain to patient.  Allowed time to recover and coached on breathing and relaxation.  Cleansed all sites with saline, and betadine soak applied to thumb.  After soak, patted dry and all sites topped with adaptic. Dry gauze applied, and secured with kerlix and ACE. Patient tolerated well despite severe pain.  He is requesting shower. Informed him that I will notify his primary nurse, and that  From wound care standpoint, he can shower. His nurse will need to clamp and detach from wound vac temporarily, and place plastic bag securely over hand to keep site dry, then reattach to vac after shower, but to leave clamped no longer than 20 minutes. Also discussed home wound care recommendations from Dr. Montague with patient including BID wound care with betadine soaks, gauze, kerlix, ACE that he/family will have to perform, and 3 times weekly OP wound care for wound vac dressing change.  Emphasized need for compliance with wound care to prevent worsening of infection and amputation.  Patient is expecting to discharge today with home wound vac. Discussed with CM and indigent forms filled out for vac.

## 2018-11-30 NOTE — ASSESSMENT & PLAN NOTE
-Patient with nonfasting hyperglycemia on all measurements in his chart and recurrent infections.  -Check fasting glucose and hemoglobin A1C to further evaluate.    -[id number] 5.4

## 2018-11-30 NOTE — ASSESSMENT & PLAN NOTE
Due to compartment syndrome. Has wound vac in place. Awaiting approval for outpatient wound vac. Will need dressing changes TID once wound vac has been approved. Will need to arrange at [facility].

## 2018-11-30 NOTE — HPI
Margarito Gale is a 28 y.o. male who was admitted for evaluation and treatment of cellulitis of the right hand and forearm.  Empirically treated with Vancomycin.  27 November taken to the OR by orthopedic surgery for I&D and fasciotomy.  Return to OR for closure and wound VAC placement 29 November.  Cultures grew S.pyogenes and S. Aureus.  Sensitivities pending. General Surgery was consulted for definitive closure, possible skin grafting in the future.

## 2018-11-30 NOTE — ASSESSMENT & PLAN NOTE
General surgery consulted for definitive closure with possible skin grafts in the future.   Discussed with the patient the possible need for skin graft for closure.   Recommend continue current wound care per Ortho and Wound Care nurse.   Pt should follow up in General Surgery clinic with Dr Aguilar in approximately 2-3 weeks.

## 2018-11-30 NOTE — PLAN OF CARE
Problem: Patient Care Overview  Goal: Plan of Care Review  Outcome: Ongoing (interventions implemented as appropriate)  Pt AAO v 4.   Patient can be very agitated if pain medications are not administered in a certain time frame per his preference.  Pt remained afebrile throughout this shift. He able to make needs known to staff.   IV Vancomycin administered per order.   Pt remained free of falls this shift.   Pt complains pain this shift. IV Pain meds administered as ordered.   Plan of care reviewed. Patient verbalizes understanding.   Pt moving/turing independently. Frequent weight shifting encouraged.  Patient NSR on monitor.   Bed low, side rails up x 2, wheels locked, call light in reach.   Patient instructed to call for assistance.   Hourly rounding completed. .  Will continue to monitor.

## 2018-11-30 NOTE — PLAN OF CARE
Patient's mother came to hospital and completed [facility] wound vac indigent form.  CM awaiting [facility] wound vac order form to submit to [facility]

## 2018-11-30 NOTE — SUBJECTIVE & OBJECTIVE
No current facility-administered medications on file prior to encounter.      No current outpatient medications on file prior to encounter.       Review of patient's allergies indicates:   Allergen Reactions    Pcn [penicillins] Anaphylaxis    Amoxicillin Hives       Past Medical History:   Diagnosis Date    ADD (attention deficit disorder)     Lumbago      Past Surgical History:   Procedure Laterality Date    APPLICATION OF WOUND VACUUM-ASSISTED CLOSURE DEVICE Right 11/29/2018    Procedure: APPLICATION, WOUND VAC;  Surgeon: Jose Montague MD;  Location: [facility] OR;  Service: Orthopedics;  Laterality: Right;    APPLICATION, WOUND VAC Right 11/29/2018    Performed by Jose Montague MD at [facility] OR    CARPAL TUNNEL RELEASE Right 11/27/2018    Procedure: RELEASE, CARPAL TUNNEL;  Surgeon: Jose Montague MD;  Location: [facility] OR;  Service: Orthopedics;  Laterality: Right;    CLOSURE OF WOUND Right 11/29/2018    Procedure: CLOSURE, WOUND;  Surgeon: Jose Montague MD;  Location: [facility] OR;  Service: Orthopedics;  Laterality: Right;    CLOSURE, WOUND Right 11/29/2018    Performed by Jose Montague MD at [facility] OR    FASCIOTOMY OF HAND Right 11/27/2018    Procedure: FASCIOTOMY, HAND;  Surgeon: Jose Montague MD;  Location: [facility] OR;  Service: Orthopedics;  Laterality: Right;    FASCIOTOMY, HAND Right 11/27/2018    Performed by Jose Montague MD at [facility] OR    INCISION AND DRAINAGE OF HAND Right 11/27/2018    Procedure: INCISION AND DRAINAGE, HAND;  Surgeon: Jose Montague MD;  Location: [facility] OR;  Service: Orthopedics;  Laterality: Right;    INCISION AND DRAINAGE, HAND Right 11/27/2018    Performed by Jose Montague MD at [facility] OR    INCISION AND DRAINAGE, UPPER EXTREMITY Right 11/29/2018    Performed by Jose Montague MD at [facility] OR    RELEASE, CARPAL TUNNEL Right 11/27/2018    Performed by Jose Montague MD at [facility] OR    TONSILLECTOMY       Family  History     None        Tobacco Use    Smoking status: Current Every Day Smoker     Packs/day: 0.50     Types: Cigarettes    Smokeless tobacco: Current User     Types: Snuff   Substance and Sexual Activity    Alcohol use: Yes     Comment: states that he drinks when he feels like it; unable to quantify average amount    Drug use: Yes     Types: Marijuana    Sexual activity: Not on file     Review of Systems   Constitutional: Negative for activity change, chills and fever.   Respiratory: Negative for shortness of breath.    Cardiovascular: Negative for chest pain.   Gastrointestinal: Negative for abdominal pain, nausea and vomiting.   Genitourinary: Negative for dysuria.   Musculoskeletal: Positive for arthralgias and joint swelling. Negative for myalgias.        Right hand, wrist, and forearm   Skin: Positive for wound.   Neurological: Negative for weakness.   Hematological: Does not bruise/bleed easily.   Psychiatric/Behavioral: The patient is not nervous/anxious.      Objective:     Vital Signs (Most Recent):  Temp: 97.8 °F (36.6 °C) (11/30/18 0938)  Pulse: 76 (11/30/18 0938)  Resp: (!) 76 (11/30/18 0938)  BP: 130/80 (11/30/18 0938)  SpO2: 100 % (11/30/18 0938) Vital Signs (24h Range):  Temp:  [97.3 °F (36.3 °C)-98.7 °F (37.1 °C)] 97.8 °F (36.6 °C)  Pulse:  [] 76  Resp:  [14-76] 76  SpO2:  [96 %-100 %] 100 %  BP: (118-133)/(71-86) 130/80     Weight: 63.3 kg (139 lb 8.8 oz)  Body mass index is 21.86 kg/m².    Physical Exam   Constitutional: He is oriented to person, place, and time. He appears well-developed and well-nourished.   HENT:   Head: Normocephalic and atraumatic.   Eyes: EOM are normal.   Cardiovascular: Normal rate and regular rhythm.   Pulmonary/Chest: Effort normal. No respiratory distress.   Abdominal: Soft.   Musculoskeletal: Normal range of motion. He exhibits edema and tenderness.   Right upper extremity with swelling, edema, wound vac in place. Photos in chart reviewed   Neurological: He  is alert and oriented to person, place, and time. No sensory deficit.   Skin: Skin is warm and dry.   Multiple tattoos   Psychiatric: He has a normal mood and affect. Thought content normal.   Vitals reviewed.      Significant Labs:  CBC:   Recent Labs   Lab 11/30/18  0536   WBC 13.68*   RBC 3.69*   HGB 11.3*   HCT 32.3*      MCV 88   MCH 30.6   MCHC 35.0     CMP:   Recent Labs   Lab 11/27/18  1248  11/30/18  0536   *   < > 164*   CALCIUM 9.9   < > 9.4   ALBUMIN 4.3  --   --    PROT 7.5  --   --    *   < > 139   K 3.8   < > 4.2   CO2 21*   < > 26      < > 104   BUN 14   < > 9   CREATININE 1.0   < > 0.8   ALKPHOS 86  --   --    ALT 19  --   --    AST 32  --   --    BILITOT 1.1*  --   --     < > = values in this interval not displayed.       Significant Diagnostics:  I have reviewed all pertinent imaging results/findings within the past 24 hours.

## 2018-11-30 NOTE — PLAN OF CARE
Problem: Patient Care Overview  Goal: Plan of Care Review  Outcome: Ongoing (interventions implemented as appropriate)  The patient has been sinus rhythm on the monitor. Wound vac in place. Pt is anxious and states he wants to go outside to smoke a cigarette. Pt is concerned about when he is going to be discharged because he is ready to go. Pt refused nicotine patch. Pt resting quietly, will continue to monitor.

## 2018-12-01 VITALS
WEIGHT: 139.56 LBS | HEART RATE: 77 BPM | SYSTOLIC BLOOD PRESSURE: 132 MMHG | OXYGEN SATURATION: 98 % | HEIGHT: 67 IN | TEMPERATURE: 98 F | RESPIRATION RATE: 18 BRPM | DIASTOLIC BLOOD PRESSURE: 85 MMHG | BODY MASS INDEX: 21.9 KG/M2

## 2018-12-01 LAB — BACTERIA SPEC AEROBE CULT: NO GROWTH

## 2018-12-01 PROCEDURE — 63600175 PHARM REV CODE 636 W HCPCS: Performed by: INTERNAL MEDICINE

## 2018-12-01 PROCEDURE — 25000003 PHARM REV CODE 250: Performed by: INTERNAL MEDICINE

## 2018-12-01 RX ADMIN — VANCOMYCIN HYDROCHLORIDE 750 MG: 750 INJECTION, POWDER, LYOPHILIZED, FOR SOLUTION INTRAVENOUS at 12:12

## 2018-12-01 RX ADMIN — MORPHINE SULFATE 2 MG: 2 INJECTION, SOLUTION INTRAMUSCULAR; INTRAVENOUS at 12:12

## 2018-12-01 NOTE — PROGRESS NOTES
Vancomycin Progress Note    Indication: cellulitis of hand/forearm s/p I&D and fasciotomy   WBC = 13.68 (down from 20.61 yesterday)  Tmax = 98.5  Cultures:     Abscess -- Staph aureus (susceptibility pending) & Strep pyogenes      Blood x 2 (11/27) -- NGTD  SCr = 0.8 (stable)    Previous dose: 1250 mg every 12 hours  Vanc trough was due @ 1230 today but was not collected until 1604 (3.5 hrs late) = 7.2 mcg/ml   Asked nurse to give the 1250 mg dose (given @ 1704) then changed dose to 750 mg every 8 hours (hx of drug abuse -- see tox screen)  Trough due tomorrow 12/01 @ 1600   Goal trough: 10-15 mcg/ml    Thank you for allowing us to participate in this patient's care.   Katherine McArdle, Pharm.D. 11/30/2018 7:41 PM

## 2018-12-01 NOTE — PROGRESS NOTES
Patient beligerent, cursing and yelling loudly on phone with girlfriend - arguing.  Pleaded with patient to calm down and to keep voice down - other patients complaining.  Said he would comply.  Proceeded to argue and yell on phone - security called - patient threatened to leave AMA.  MD Muhammad notified.      Discussed risks of leaving AMA.  Was able to calm patient down and reason with him.  Agreed to stay and receive treatment.  Xanax ordered per MD.

## 2018-12-01 NOTE — PLAN OF CARE
Problem: Patient Care Overview  Goal: Plan of Care Review  Outcome: Ongoing (interventions implemented as appropriate)  POC and interventions discussed with patient - ALEKSEY.  AAO x 4.  Vitals stable.  Has wound vac to R hand - site wnl.  No drainage noted. Seal maintained. Gauze and ACE wrap from wrist to base of fingers.  + sensation to all fingers. Denies numbness/tingling. Cap refill < 3 sec. C/o pain - controlled adequately with analgesic - see MAR.  Is frustrated - wants to leave hospital ASAP.  Disappointed that he has not received a home wound vac and been discharged.  Will pass along info to  in AM.  Afebrile.

## 2018-12-01 NOTE — PROGRESS NOTES
R arm / hand with ACE wrap.  Open incision to Dorsal aspect of R Forearm / wrist with wound vac, seal intact - machine ON. SUction -125 maintained.  ACE wrap around hand and wrist.  Fingers protruding.  Cap refill < 3 seconds.  + sensation to all fingers.  Denies numbness/tingling

## 2018-12-02 LAB
BACTERIA BLD CULT: NORMAL
BACTERIA BLD CULT: NORMAL
BACTERIA SPEC AEROBE CULT: NORMAL
BACTERIA SPEC AEROBE CULT: NORMAL

## 2018-12-02 NOTE — PLAN OF CARE
12/02/18 1336   Final Note   Assessment Type Final Discharge Note   Anticipated Discharge Disposition Left Against

## 2018-12-03 LAB — BACTERIA SPEC ANAEROBE CULT: NORMAL

## 2018-12-03 NOTE — ANESTHESIA POSTPROCEDURE EVALUATION
"Anesthesia Post Evaluation    Patient: Margarito Gale    Procedure(s) Performed: Procedure(s) (LRB):  INCISION AND DRAINAGE, HAND (Right)  FASCIOTOMY, HAND (Right)  RELEASE, CARPAL TUNNEL (Right)    Final Anesthesia Type: general  Patient location during evaluation: PACU  Patient participation: Yes- Able to Participate  Level of consciousness: awake and alert  Post-procedure vital signs: reviewed and stable  Pain management: adequate  Airway patency: patent  PONV status at discharge: No PONV  Anesthetic complications: no      Cardiovascular status: blood pressure returned to baseline  Respiratory status: unassisted  Hydration status: euvolemic  Follow-up not needed.        Visit Vitals  /85   Pulse 77   Temp 36.8 °C (98.3 °F)   Resp 18   Ht 5' 7" (1.702 m)   Wt 63.3 kg (139 lb 8.8 oz)   SpO2 98%   BMI 21.86 kg/m²       Pain/Natalie Score: No Data Recorded      "

## 2018-12-04 NOTE — DISCHARGE SUMMARY
[facility] Hospital Medicine  Discharge Summary      Patient Name: Margarito Gale  MRN: [id number]  Admission Date: 11/27/2018  Hospital Length of Stay: 3 days  Discharge Date and Time:  12/04/2018 12:06 PM  Attending Physician: Blank att. providers found   Discharging Provider: Ramses Santos NP  Primary Care Provider: FREDDIE Butcher      HPI:   Margarito Gale is a 28 year old male who presented to the ED with complaints of right hand swelling and pain. The patient reports that he was cutting a ziptie while working on his bike outside and cut his hand. He denies that the knife was dirty and has not tried any treatments. He further denies other symptoms including fever. Review of the patient's chart shows that he has a history of recurrent infections including a gluteal abscess in July of 2018 and left elbow cellulitis in May of 2018. In the ED, the patient's WBC count was 25,090 and his blood glucose was mildly elevated to 151.     Procedure(s) (LRB):  INCISION AND DRAINAGE, UPPER EXTREMITY (Right)  APPLICATION, WOUND VAC (Right)  CLOSURE, WOUND (Right)      Hospital Course:   Admitted for evaluation and treatment of cellulitis of the right hand and forearm.  Empirically treated with Vancomycin.  27 November taken to the OR by orthopedic surgery for I&D and fasciotomy.  Return to OR for closure and wound VAC placement 29 November.  Cultures grew S.pyogenes and S. Aureus.  Sensitivities pending.  Continuing Vancomycin.     11/30/18 POD #1 will need extended care with wound vac due to complex nature of wound closure.   Seen by General surgery who recommends outpatient follow up for skin graft. Will need to arrange dressing change appointments with nancy nick after wound vac has been approved. Nonweightbearing to right UE.     Patient subsequently left AMA in the middle of the night. AMA paperwork was signed. IV catheter and wound vac were discontinued.         Consults:    Consults (From admission, onward)        Status Ordering Provider     Inpatient consult to General Surgery  Once     Provider:  FARHAN Zaidi DRESHELLIA          No new Assessment & Plan notes have been filed under this hospital service since the last note was generated.  Service: Hospital Medicine    Final Active Diagnoses:    Diagnosis Date Noted POA    PRINCIPAL PROBLEM:  Cellulitis of hand with sepsis [L03.119] 11/27/2018 Yes    H/O fasciotomy [Z98.890] 11/29/2018 Not Applicable    Hyperglycemia [R73.9] 11/27/2018 Yes    Tobacco abuse [Z72.0] 11/27/2018 Yes      Problems Resolved During this Admission:       Discharged Condition: against medical advice    Disposition: Left Against Medical Adv*    Follow Up:  Follow-up Information     Prabha Aguilar MD In 2 weeks.    Specialty:  General Surgery  Contact information:  [address]  [place] [address] [phone]                 Patient Instructions:   No discharge procedures on file.    Significant Diagnostic Studies: Labs: CMP No results for input(s): NA, K, CL, CO2, GLU, BUN, CREATININE, CALCIUM, PROT, ALBUMIN, BILITOT, ALKPHOS, AST, ALT, ANIONGAP, ESTGFRAFRICA, EGFRNONAA in the last 48 hours. and CBC No results for input(s): WBC, HGB, HCT, PLT in the last 48 hours.    Pending Diagnostic Studies:     None         Medications:  Reconciled Home Medications:      Medication List      You have not been prescribed any medications.         Indwelling Lines/Drains at time of discharge:   Lines/Drains/Airways     Pressure Ulcer                 Negative Pressure Wound Therapy  4 days                Time spent on the discharge of patient: >35 minutes  Patient was seen and examined on the date of discharge and determined to be suitable for discharge.         Ramses Santos NP  Department of Hospital Medicine  [facility]

## 2018-12-06 NOTE — PHYSICIAN QUERY
PT Name: Margarito Gale  MR #: [id number]    Physician Query Form - Cause and Effect Relationship Clarification      CDS: Rika Lima RN  Contact information: [email or web address]    This form is a permanent document in the medical record.     Query Date: December 6, 2018    By submitting this query, we are merely seeking further clarification of documentation. Please utilize your independent clinical judgment when addressing the question(s) below.    The Medical record contains the following:  Supporting Clinical Findings   Location in record   Aerobic culture  Specimen Information: Hand, Right; Abscess  Collected: 11/27/2018  9:07 PM  STREPTOCOCCUS PYOGENES (GROUP A)   Many    STAPHYLOCOCCUS AUREUS   Moderate                                                                                                                                                                                      Labs    Cellulitis of hand with sepsis  IV Vancomycin.  CT hand shows soft tissue swelling without gas or abscess.  Evaluated by Orthopedic Surgery and taken to the OR 27 November for I&D and fasciotomy.  Wound and blood cultures pending.  11/30/18 final cultures grew strep and MSSA. See plan above                                                                                                        HM PN 11/30     Provider, please clarify if there is any correlation between Sepsis and MSSA and Strep pyogenes.  Are they:    [x  ] Due to or associated with each other   [  ] Unrelated to each other   [  ] Other (Please Specify): _________________________   [  ] Clinically Undetermined

## 2018-12-07 LAB — BACTERIA SPEC ANAEROBE CULT: NORMAL

## 2019-12-12 NOTE — ANESTHESIA POSTPROCEDURE EVALUATION
"Anesthesia Post Evaluation    Patient: Margarito Gale    Procedure(s) Performed: Procedure(s) (LRB):  INCISION AND DRAINAGE, UPPER EXTREMITY (Right)  APPLICATION, WOUND VAC (Right)  CLOSURE, WOUND (Right)    Final Anesthesia Type: general  Patient location during evaluation: PACU  Patient participation: Yes- Able to Participate  Level of consciousness: awake and alert  Post-procedure vital signs: reviewed and stable  Pain management: adequate  Airway patency: patent  PONV status at discharge: No PONV  Anesthetic complications: no      Cardiovascular status: hemodynamically stable  Respiratory status: unassisted  Hydration status: euvolemic  Follow-up not needed.        Visit Vitals  /76 (BP Location: Left arm, Patient Position: Lying)   Pulse 101   Temp 36.6 °C (97.9 °F) (Oral)   Resp 16   Ht 5' 7" (1.702 m)   Wt 63.3 kg (139 lb 8.8 oz)   SpO2 100%   BMI 21.86 kg/m²       Pain/Natalie Score: Pain Assessment Performed: Yes (11/29/2018  6:30 PM)  Presence of Pain: complains of pain/discomfort (11/29/2018  6:30 PM)  Pain Rating Prior to Med Admin: 7 (11/29/2018  6:30 PM)  Pain Rating Post Med Admin: 4 (11/29/2018  9:49 AM)  Natalie Score: 8 (11/29/2018  6:15 PM)        "
absent
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
## (undated) DEVICE — CORD BIPOLAR ELECTROSURGICAL

## (undated) DEVICE — SUT VICRYL 2-0 CT-1 VCP345H

## (undated) DEVICE — GLOVE PROTEXIS HYDROGEL SZ7.5

## (undated) DEVICE — ELECTRODE REM PLYHSV RETURN 9

## (undated) DEVICE — SUT PDS II O CT-2 VIL MONO

## (undated) DEVICE — GAUZE SPONGE 4X4 12PLY

## (undated) DEVICE — SUT 4-0 ETHILON 18 PS-2

## (undated) DEVICE — SUT VICRYL 3-0 OB 36 CT-1

## (undated) DEVICE — GLOVE 7.5 PROTEXIS PI BLUE

## (undated) DEVICE — STRIP WOUND PK BIGUANIDE 36X.5

## (undated) DEVICE — SEE MEDLINE ITEM 146298

## (undated) DEVICE — STOCKINET TUBULAR 1 PLY 6X60IN

## (undated) DEVICE — SEE MEDLINE ITEM 154981

## (undated) DEVICE — KIT DRSNG GRNUFM MED 18X12X5CM

## (undated) DEVICE — SEE MEDLINE ITEM 157027

## (undated) DEVICE — SEE MEDLINE ITEM 157131

## (undated) DEVICE — SUT ETHICON 3-0 BLK MONO PS

## (undated) DEVICE — SUT VICRYL ANTIMICRO VIL BR

## (undated) DEVICE — GLOVE 7.5 PROTEXIS PI ORTHO PF

## (undated) DEVICE — DRESSING VAC WHITE FOAM SMALL

## (undated) DEVICE — SUT PROLENE 0 MO6 30IN BLUE

## (undated) DEVICE — SOL NACL IRR 1000ML BTL

## (undated) DEVICE — SYR 10CC LUER LOCK

## (undated) DEVICE — DRESSING XEROFORM FOIL PK 1X8

## (undated) DEVICE — DEV-O-LOOPS MAXI RED

## (undated) DEVICE — BANDAGE CONFORM 3IN STRL

## (undated) DEVICE — CANISTER VACCUUM DRSNG KCI

## (undated) DEVICE — MANIFOLD 4 PORT

## (undated) DEVICE — SEE MEDLINE ITEM 146308

## (undated) DEVICE — SEE MEDLINE ITEM 157117

## (undated) DEVICE — COVER OVERHEAD SURG LT BLUE

## (undated) DEVICE — STRIP PACKING ANTIMIC 1/4X1

## (undated) DEVICE — DRESSING N ADH OIL EMUL 3X8

## (undated) DEVICE — SEE MEDLINE ITEM 152622

## (undated) DEVICE — SOL NS 1000CC

## (undated) DEVICE — NDL SAFETY 25G X 1.5 ECLIPSE

## (undated) DEVICE — SUT PROLENE 1 CTX 30IN BLUE

## (undated) DEVICE — SEE MEDLINE ITEM 152529

## (undated) DEVICE — GLOVE 8 PROTEXIS PI BLUE

## (undated) DEVICE — SEE MEDLINE ITEM 157216